# Patient Record
Sex: MALE | URBAN - METROPOLITAN AREA
[De-identification: names, ages, dates, MRNs, and addresses within clinical notes are randomized per-mention and may not be internally consistent; named-entity substitution may affect disease eponyms.]

---

## 2018-05-16 ENCOUNTER — APPOINTMENT (RX ONLY)
Dept: URBAN - METROPOLITAN AREA CLINIC 4 | Facility: CLINIC | Age: 68
Setting detail: DERMATOLOGY
End: 2018-05-16

## 2018-05-16 DIAGNOSIS — A60.1 HERPESVIRAL INFECTION OF PERIANAL SKIN AND RECTUM: ICD-10-CM

## 2018-05-16 DIAGNOSIS — L21.8 OTHER SEBORRHEIC DERMATITIS: ICD-10-CM

## 2018-05-16 DIAGNOSIS — L85.3 XEROSIS CUTIS: ICD-10-CM

## 2018-05-16 PROCEDURE — 99202 OFFICE O/P NEW SF 15 MIN: CPT

## 2018-05-16 PROCEDURE — ? PRESCRIPTION

## 2018-05-16 PROCEDURE — ? COUNSELING

## 2018-05-16 RX ORDER — HYDROCORTISONE 25 MG/G
CREAM TOPICAL
Qty: 1 | Refills: 2 | Status: CANCELLED

## 2018-05-16 ASSESSMENT — LOCATION ZONE DERM
LOCATION ZONE: SCALP
LOCATION ZONE: TRUNK
LOCATION ZONE: FACE
LOCATION ZONE: TRUNK

## 2018-05-16 ASSESSMENT — LOCATION DETAILED DESCRIPTION DERM
LOCATION DETAILED: LEFT BUTTOCK
LOCATION DETAILED: RIGHT MEDIAL FRONTAL SCALP
LOCATION DETAILED: LEFT BUTTOCK
LOCATION DETAILED: INFERIOR THORACIC SPINE
LOCATION DETAILED: SUPERIOR MID FOREHEAD

## 2018-05-16 ASSESSMENT — LOCATION SIMPLE DESCRIPTION DERM
LOCATION SIMPLE: LEFT BUTTOCK
LOCATION SIMPLE: SUPERIOR FOREHEAD
LOCATION SIMPLE: UPPER BACK
LOCATION SIMPLE: RIGHT SCALP
LOCATION SIMPLE: LEFT BUTTOCK

## 2018-10-11 ENCOUNTER — HOSPITAL ENCOUNTER (OUTPATIENT)
Dept: HOSPITAL 8 - CFH | Age: 68
Discharge: HOME | End: 2018-10-11
Attending: PSYCHIATRY & NEUROLOGY
Payer: MEDICARE

## 2018-10-11 DIAGNOSIS — M51.35: Primary | ICD-10-CM

## 2018-10-11 DIAGNOSIS — M99.53: ICD-10-CM

## 2018-10-11 DIAGNOSIS — M50.30: ICD-10-CM

## 2018-10-11 PROCEDURE — 72156 MRI NECK SPINE W/O & W/DYE: CPT

## 2018-10-11 PROCEDURE — A9585 GADOBUTROL INJECTION: HCPCS

## 2018-10-11 PROCEDURE — 72157 MRI CHEST SPINE W/O & W/DYE: CPT

## 2018-10-11 PROCEDURE — 72158 MRI LUMBAR SPINE W/O & W/DYE: CPT

## 2019-12-23 ENCOUNTER — APPOINTMENT (OUTPATIENT)
Dept: RADIOLOGY | Facility: MEDICAL CENTER | Age: 69
End: 2019-12-23
Attending: EMERGENCY MEDICINE
Payer: MEDICARE

## 2019-12-23 ENCOUNTER — APPOINTMENT (OUTPATIENT)
Dept: CARDIOLOGY | Facility: MEDICAL CENTER | Age: 69
End: 2019-12-23
Attending: HOSPITALIST
Payer: MEDICARE

## 2019-12-23 ENCOUNTER — HOSPITAL ENCOUNTER (OUTPATIENT)
Facility: MEDICAL CENTER | Age: 69
End: 2019-12-24
Attending: EMERGENCY MEDICINE | Admitting: HOSPITALIST
Payer: MEDICARE

## 2019-12-23 PROBLEM — B00.9 HERPES: Status: ACTIVE | Noted: 2019-12-23

## 2019-12-23 PROBLEM — R53.1 LEFT-SIDED WEAKNESS: Status: ACTIVE | Noted: 2019-12-23

## 2019-12-23 LAB
ANION GAP SERPL CALC-SCNC: 12 MMOL/L (ref 0–11.9)
BASOPHILS # BLD AUTO: 0.6 % (ref 0–1.8)
BASOPHILS # BLD: 0.03 K/UL (ref 0–0.12)
BUN SERPL-MCNC: 14 MG/DL (ref 8–22)
CALCIUM SERPL-MCNC: 9.6 MG/DL (ref 8.4–10.2)
CHLORIDE SERPL-SCNC: 103 MMOL/L (ref 96–112)
CO2 SERPL-SCNC: 26 MMOL/L (ref 20–33)
CREAT SERPL-MCNC: 0.9 MG/DL (ref 0.5–1.4)
EKG IMPRESSION: NORMAL
EOSINOPHIL # BLD AUTO: 0.06 K/UL (ref 0–0.51)
EOSINOPHIL NFR BLD: 1.1 % (ref 0–6.9)
ERYTHROCYTE [DISTWIDTH] IN BLOOD BY AUTOMATED COUNT: 41 FL (ref 35.9–50)
GLUCOSE SERPL-MCNC: 113 MG/DL (ref 65–99)
HCT VFR BLD AUTO: 44.1 % (ref 42–52)
HGB BLD-MCNC: 15 G/DL (ref 14–18)
IMM GRANULOCYTES # BLD AUTO: 0.01 K/UL (ref 0–0.11)
IMM GRANULOCYTES NFR BLD AUTO: 0.2 % (ref 0–0.9)
LYMPHOCYTES # BLD AUTO: 1.22 K/UL (ref 1–4.8)
LYMPHOCYTES NFR BLD: 23 % (ref 22–41)
MCH RBC QN AUTO: 32.1 PG (ref 27–33)
MCHC RBC AUTO-ENTMCNC: 34 G/DL (ref 33.7–35.3)
MCV RBC AUTO: 94.2 FL (ref 81.4–97.8)
MONOCYTES # BLD AUTO: 0.56 K/UL (ref 0–0.85)
MONOCYTES NFR BLD AUTO: 10.5 % (ref 0–13.4)
NEUTROPHILS # BLD AUTO: 3.43 K/UL (ref 1.82–7.42)
NEUTROPHILS NFR BLD: 64.6 % (ref 44–72)
NRBC # BLD AUTO: 0 K/UL
NRBC BLD-RTO: 0 /100 WBC
PLATELET # BLD AUTO: 213 K/UL (ref 164–446)
PMV BLD AUTO: 10.1 FL (ref 9–12.9)
POTASSIUM SERPL-SCNC: 3.6 MMOL/L (ref 3.6–5.5)
RBC # BLD AUTO: 4.68 M/UL (ref 4.7–6.1)
SODIUM SERPL-SCNC: 141 MMOL/L (ref 135–145)
WBC # BLD AUTO: 5.3 K/UL (ref 4.8–10.8)

## 2019-12-23 PROCEDURE — 85025 COMPLETE CBC W/AUTO DIFF WBC: CPT

## 2019-12-23 PROCEDURE — 70450 CT HEAD/BRAIN W/O DYE: CPT

## 2019-12-23 PROCEDURE — 700111 HCHG RX REV CODE 636 W/ 250 OVERRIDE (IP): Performed by: HOSPITALIST

## 2019-12-23 PROCEDURE — 80048 BASIC METABOLIC PNL TOTAL CA: CPT

## 2019-12-23 PROCEDURE — 96372 THER/PROPH/DIAG INJ SC/IM: CPT

## 2019-12-23 PROCEDURE — 83036 HEMOGLOBIN GLYCOSYLATED A1C: CPT

## 2019-12-23 PROCEDURE — G0378 HOSPITAL OBSERVATION PER HR: HCPCS

## 2019-12-23 PROCEDURE — 70551 MRI BRAIN STEM W/O DYE: CPT

## 2019-12-23 PROCEDURE — 93306 TTE W/DOPPLER COMPLETE: CPT

## 2019-12-23 PROCEDURE — 99285 EMERGENCY DEPT VISIT HI MDM: CPT

## 2019-12-23 PROCEDURE — A9270 NON-COVERED ITEM OR SERVICE: HCPCS | Performed by: HOSPITALIST

## 2019-12-23 PROCEDURE — 70544 MR ANGIOGRAPHY HEAD W/O DYE: CPT

## 2019-12-23 PROCEDURE — 93005 ELECTROCARDIOGRAM TRACING: CPT | Performed by: EMERGENCY MEDICINE

## 2019-12-23 PROCEDURE — 99220 PR INITIAL OBSERVATION CARE,LEVL III: CPT | Performed by: HOSPITALIST

## 2019-12-23 PROCEDURE — 700102 HCHG RX REV CODE 250 W/ 637 OVERRIDE(OP): Performed by: HOSPITALIST

## 2019-12-23 PROCEDURE — 36415 COLL VENOUS BLD VENIPUNCTURE: CPT

## 2019-12-23 RX ORDER — ACYCLOVIR 400 MG/1
400 TABLET ORAL DAILY
COMMUNITY
End: 2023-01-31

## 2019-12-23 RX ORDER — IBUPROFEN 200 MG
800 TABLET ORAL 2 TIMES DAILY PRN
COMMUNITY

## 2019-12-23 RX ORDER — ASPIRIN 325 MG
325 TABLET ORAL DAILY
Status: DISCONTINUED | OUTPATIENT
Start: 2019-12-23 | End: 2019-12-24 | Stop reason: HOSPADM

## 2019-12-23 RX ORDER — ONDANSETRON 2 MG/ML
4 INJECTION INTRAMUSCULAR; INTRAVENOUS EVERY 4 HOURS PRN
Status: DISCONTINUED | OUTPATIENT
Start: 2019-12-23 | End: 2019-12-24 | Stop reason: HOSPADM

## 2019-12-23 RX ORDER — AMOXICILLIN 250 MG
2 CAPSULE ORAL 2 TIMES DAILY
Status: DISCONTINUED | OUTPATIENT
Start: 2019-12-23 | End: 2019-12-24 | Stop reason: HOSPADM

## 2019-12-23 RX ORDER — ACYCLOVIR 200 MG/1
400 CAPSULE ORAL DAILY
Status: DISCONTINUED | OUTPATIENT
Start: 2019-12-24 | End: 2019-12-24 | Stop reason: HOSPADM

## 2019-12-23 RX ORDER — BISACODYL 10 MG
10 SUPPOSITORY, RECTAL RECTAL
Status: DISCONTINUED | OUTPATIENT
Start: 2019-12-23 | End: 2019-12-24 | Stop reason: HOSPADM

## 2019-12-23 RX ORDER — ATORVASTATIN CALCIUM 40 MG/1
80 TABLET, FILM COATED ORAL EVERY EVENING
Status: DISCONTINUED | OUTPATIENT
Start: 2019-12-23 | End: 2019-12-24 | Stop reason: HOSPADM

## 2019-12-23 RX ORDER — ONDANSETRON 4 MG/1
4 TABLET, ORALLY DISINTEGRATING ORAL EVERY 4 HOURS PRN
Status: DISCONTINUED | OUTPATIENT
Start: 2019-12-23 | End: 2019-12-24 | Stop reason: HOSPADM

## 2019-12-23 RX ORDER — POLYETHYLENE GLYCOL 3350 17 G/17G
1 POWDER, FOR SOLUTION ORAL
Status: DISCONTINUED | OUTPATIENT
Start: 2019-12-23 | End: 2019-12-24 | Stop reason: HOSPADM

## 2019-12-23 RX ORDER — ASPIRIN 600 MG/1
300 SUPPOSITORY RECTAL DAILY
Status: DISCONTINUED | OUTPATIENT
Start: 2019-12-23 | End: 2019-12-24 | Stop reason: HOSPADM

## 2019-12-23 RX ORDER — ASPIRIN 81 MG/1
324 TABLET, CHEWABLE ORAL DAILY
Status: DISCONTINUED | OUTPATIENT
Start: 2019-12-23 | End: 2019-12-24 | Stop reason: HOSPADM

## 2019-12-23 RX ADMIN — ATORVASTATIN CALCIUM 80 MG: 40 TABLET, FILM COATED ORAL at 17:42

## 2019-12-23 RX ADMIN — ASPIRIN 325 MG ORAL TABLET 325 MG: 325 PILL ORAL at 15:36

## 2019-12-23 RX ADMIN — ENOXAPARIN SODIUM 40 MG: 100 INJECTION SUBCUTANEOUS at 15:36

## 2019-12-23 ASSESSMENT — LIFESTYLE VARIABLES
TOTAL SCORE: 1
EVER HAD A DRINK FIRST THING IN THE MORNING TO STEADY YOUR NERVES TO GET RID OF A HANGOVER: NO
EVER FELT BAD OR GUILTY ABOUT YOUR DRINKING: NO
HAVE YOU EVER FELT YOU SHOULD CUT DOWN ON YOUR DRINKING: YES
HAVE PEOPLE ANNOYED YOU BY CRITICIZING YOUR DRINKING: NO
AVERAGE NUMBER OF DAYS PER WEEK YOU HAVE A DRINK CONTAINING ALCOHOL: 3
CONSUMPTION TOTAL: NEGATIVE
ALCOHOL_USE: YES
ON A TYPICAL DAY WHEN YOU DRINK ALCOHOL HOW MANY DRINKS DO YOU HAVE: 2
EVER_SMOKED: NEVER
TOTAL SCORE: 1
HOW MANY TIMES IN THE PAST YEAR HAVE YOU HAD 5 OR MORE DRINKS IN A DAY: 0
TOTAL SCORE: 1

## 2019-12-23 ASSESSMENT — COGNITIVE AND FUNCTIONAL STATUS - GENERAL
MOVING TO AND FROM BED TO CHAIR: A LITTLE
MOBILITY SCORE: 19
WALKING IN HOSPITAL ROOM: A LITTLE
SUGGESTED CMS G CODE MODIFIER MOBILITY: CK
DAILY ACTIVITIY SCORE: 24
MOVING FROM LYING ON BACK TO SITTING ON SIDE OF FLAT BED: A LITTLE
SUGGESTED CMS G CODE MODIFIER DAILY ACTIVITY: CH
STANDING UP FROM CHAIR USING ARMS: A LITTLE
CLIMB 3 TO 5 STEPS WITH RAILING: A LITTLE

## 2019-12-23 ASSESSMENT — ENCOUNTER SYMPTOMS
NAUSEA: 0
TINGLING: 0
PHOTOPHOBIA: 0
EYE PAIN: 0
MYALGIAS: 1
SHORTNESS OF BREATH: 0
DEPRESSION: 0
HEADACHES: 0
NECK PAIN: 0
SPUTUM PRODUCTION: 0
DOUBLE VISION: 0
STRIDOR: 0
HEMOPTYSIS: 0
NERVOUS/ANXIOUS: 0
FEVER: 0
CONSTIPATION: 0
BLOOD IN STOOL: 0
MEMORY LOSS: 0
DIZZINESS: 0
ORTHOPNEA: 0
BACK PAIN: 0
PND: 0
COUGH: 0
WEAKNESS: 1
SENSORY CHANGE: 0
TREMORS: 0
PALPITATIONS: 0
VOMITING: 0
CLAUDICATION: 0
CHILLS: 0
HEARTBURN: 0
SORE THROAT: 0
FOCAL WEAKNESS: 1
BLURRED VISION: 0
SPEECH CHANGE: 0

## 2019-12-23 ASSESSMENT — COPD QUESTIONNAIRES
DURING THE PAST 4 WEEKS HOW MUCH DID YOU FEEL SHORT OF BREATH: NONE/LITTLE OF THE TIME
COPD SCREENING SCORE: 4
DO YOU EVER COUGH UP ANY MUCUS OR PHLEGM?: YES, EVERY DAY
HAVE YOU SMOKED AT LEAST 100 CIGARETTES IN YOUR ENTIRE LIFE: NO/DON'T KNOW
IN THE PAST 12 MONTHS DO YOU DO LESS THAN YOU USED TO BECAUSE OF YOUR BREATHING PROBLEMS: DISAGREE/UNSURE

## 2019-12-23 ASSESSMENT — PATIENT HEALTH QUESTIONNAIRE - PHQ9
SUM OF ALL RESPONSES TO PHQ9 QUESTIONS 1 AND 2: 0
2. FEELING DOWN, DEPRESSED, IRRITABLE, OR HOPELESS: NOT AT ALL
1. LITTLE INTEREST OR PLEASURE IN DOING THINGS: NOT AT ALL

## 2019-12-23 NOTE — CARE PLAN
Problem: Safety  Goal: Will remain free from falls  Outcome: PROGRESSING AS EXPECTED   Falls precautions are in place, patient's bed is locked and low, belongings are within reach. Patient uses call light appropriately.     Problem: Knowledge Deficit  Goal: Knowledge of disease process/condition, treatment plan, diagnostic tests, and medications will improve  Outcome: PROGRESSING AS EXPECTED  Discussed plan of care with patient including echo ordered, MRI, dysphagia screening, neuro checks and NIHSS. Allowed time for questions, patient agreed and verbalized understanding.

## 2019-12-23 NOTE — ED TRIAGE NOTES
"Pt presents with left leg weakness that is intermittent since Thursday. Patient states he had an episode of \"seizure like\" activity that woke him from sleep Thursday,  No hx of seizure, no loss of bowels/bladder during episode. Patient ambulatory to room, no drift, drop, droop. Denies headache. Denies trauma.   "

## 2019-12-23 NOTE — ED NOTES
ERP at bedside. Pt agrees with plan of care discussed by ERP. AIDET acknowledged with patient. Misha in low position, side rail up for pt safety. Call light within reach. Will continue to monitor.      PER ERP THIS IS NOT A CODE STROKE    IV established. Blood sent to lab.

## 2019-12-23 NOTE — ASSESSMENT & PLAN NOTE
Unclear if this is a stroke vs lumbar radiculopathy?  MRI/MRA brain ordered and pending.  MRI lumbar/thoracic ordered.  Echocardiogram ordered  PT/OT  Aspirin, high intensity statin ordered for neuro protective measures

## 2019-12-23 NOTE — ED PROVIDER NOTES
"ED Provider Note    Scribed for Asim Israel M.D. by Asim Israel. 12/23/2019,  12:32 PM.    CHIEF COMPLAINT  Chief Complaint   Patient presents with   • Weakness     patient states left leg weakness intermittently since thursday   • Seizure     patient states he had an episode of \"jolting\" that woke him up thursday,  no hx seizures       HPI  Girish Tyler is a 69 y.o. male who presents to the Emergency Department for strokelike symptoms, since Thursday night of last week.  Therefore, he is not a never was a TPA or clot retrieval candidate.  He reports that about 1:30 in the morning on Thursday, he was woken from sleep by \"tremors\" in his head, lasting 10 or 15 seconds that he says caused physical spasms.  Almost simultaneously, he noticed a charley horse sensation in his left proximal thigh.  This is been followed by persistent weakness in that leg, with a sensation of numbness, which she describes as \"thickness.\"  He says that he had a Tarlov cyst surgery years ago, and has had some mild weakness in the left leg since then, but this is much worse than usual in terms of weakness, and he has never had a problem with sensation changes.  He has had some dull occasional right-sided headache since the incident.  He is ambulatory.  He denies fevers or chills, nausea or vomiting, chest pain or shortness of breath or confusion.  Symptoms have been fairly consistent since Thursday.  He said that on Saturday, he had 1 ground-level fall, falling only to his knees.  He is not on any blood thinners.    REVIEW OF SYSTEMS  See HPI for further details. All other systems are negative.     PAST MEDICAL HISTORY   Tarlov cyst excision, baseline L leg weakness.     SOCIAL HISTORY  Social History     Tobacco Use   • Smoking status: Never Smoker   • Smokeless tobacco: Never Used   Substance and Sexual Activity   • Alcohol use: Not on file   • Drug use: Not on file   • Sexual activity: Not on file     Social History "     Substance and Sexual Activity   Drug Use Not on file       SURGICAL HISTORY  patient denies any surgical history    CURRENT MEDICATIONS  Home Medications     Reviewed by Yasmeen Jackson R.N. (Registered Nurse) on 12/23/19 at 1446  Med List Status: Complete   Medication Last Dose Status   acyclovir (ZOVIRAX) 400 MG tablet 12/23/2019 Active   ibuprofen (MOTRIN) 200 MG Tab > 3 DAYS Active                ALLERGIES  No Known Allergies    PHYSICAL EXAM  VITAL SIGNS: /68   Pulse (!) 59   Temp 36.4 °C (97.5 °F) (Oral)   Resp 16   Ht 1.829 m (6')   Wt 70.6 kg (155 lb 10.3 oz)   SpO2 94%   BMI 21.11 kg/m²   Pulse ox interpretation: I interpret this pulse ox as normal.  Constitutional: Alert in no apparent distress.  HENT: No signs of trauma, Bilateral external ears normal, Nose normal.   Eyes: Conjunctiva normal, Non-icteric.   Neck: Normal range of motion, Supple, No stridor.   Lymphatic: No lymphadenopathy noted.   Cardiovascular: Regular rate and rhythm, no murmurs.   Thorax & Lungs: Normal breath sounds, No respiratory distress, No wheezing, No chest tenderness.   Abdomen: Bowel sounds normal, Soft, No tenderness, No masses, No pulsatile masses. No peritoneal signs.  Skin: Warm, Dry, No erythema, No rash.   Extremities: Intact distal pulses, No edema, No cyanosis.  Musculoskeletal: Good range of motion in all major joints. No or major deformities noted.   Neurologic: Alert, cranial nerves II through X intact, fully alert and oriented, normal strength and range of motion and coordination in all extremities, with the exception of the left leg.  Left leg is weak in flexion and extension at the ankle, and even against gravity at the hip.  Psychiatric: Affect normal, Judgment normal, Mood normal.     DIAGNOSTIC STUDIES / PROCEDURES    LABS  Labs Reviewed   CBC WITH DIFFERENTIAL - Abnormal; Notable for the following components:       Result Value    RBC 4.68 (*)     All other components within normal limits    BASIC METABOLIC PANEL - Abnormal; Notable for the following components:    Glucose 113 (*)     Anion Gap 12.0 (*)     All other components within normal limits   CBC WITH DIFFERENTIAL - Abnormal; Notable for the following components:    WBC 4.5 (*)     RBC 4.54 (*)     All other components within normal limits    Narrative:     Fasting   ESTIMATED GFR   HEMOGLOBIN A1C   LIPID PROFILE    Narrative:     Fasting   COMP METABOLIC PANEL    Narrative:     Fasting   ESTIMATED GFR    Narrative:     Fasting     All labs reviewed by me.    RADIOLOGY  EC-ECHOCARDIOGRAM COMPLETE W/O CONT   Final Result      CT-HEAD W/O   Final Result      No acute intracranial abnormality.      MR-MRA HEAD-W/O   Final Result               MR-BRAIN-W/O   Final Result      MRI of the brain without contrast within normal limits for age with mild white matter changes.      CT-CTA HEAD WITH & W/O-POST PROCESS    (Results Pending)   CT-CTA NECK WITH & W/O-POST PROCESSING    (Results Pending)   MR-THORACIC SPINE-W/O    (Results Pending)   MR-LUMBAR SPINE-W/O    (Results Pending)     The radiologist's interpretation of all radiological studies have been reviewed by me.    COURSE & MEDICAL DECISION MAKING  Nursing notes, VS, PMSFHx reviewed in chart.     12:32 PM Patient seen and examined at bedside. Differential diagnosis includes but is not limited to hemorrhagic or ischemic stroke, multiple sclerosis, migraine equivalent, less likely Watson's paralysis. Ordered for stroke protocol labs and imaging to evaluate.     1:51 PM Dr. MENDIOLA agrees to admit. This patient's head CT is unremarkable, but our CTA-capable CT machine isn't working. He'll need MRI imaging regarding this demonstrable deficit, as well as additional stroke evaluation such as ultrasound testing.     DISPOSITION:  Patient will be admitted to the hospitalist service in stable condition.      FINAL IMPRESSION  1. Stroke-like symptoms  2. Left leg weakness

## 2019-12-23 NOTE — H&P
"  Hospital Medicine History & Physical Note    Date of Service  12/23/2019    Primary Care Physician  Damaris Barrett M.D.    Consultants  None    Code Status  Full Code    Chief Complaint  Chief Complaint   Patient presents with   • Weakness     patient states left leg weakness intermittently since thursday   • Seizure     patient states he had an episode of \"jolting\" that woke him up thursday,  no hx seizures       History of Presenting Illness  Nayeli is a very pleasant 69 y.o. male with a past medical history of discogenic disorder, with occasional left-sided leg discomfort presented to the emergency room on 12/23/2019 for evaluation of left lower extremity weakness which started Thursday night, around 1:30 in the morning patient woke up with tremors in addition to having awkward feeling in his head that lasted about 10 seconds, he felt like he was spasming, then he noticed a charley horse in his left proximal thigh area then followed with weakness, decreased sensation.  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  Otherwise denies having any vision changes headaches, chest pain shortness of breath or nausea vomiting.  He did sustain a 1 ground-level fall today which he fell to his knees, denied any head trauma or loss of consciousness.    Review of Systems  Review of Systems   Constitutional: Negative for chills, fever and malaise/fatigue.   HENT: Negative for congestion, hearing loss, sore throat and tinnitus.    Eyes: Negative for blurred vision, double vision, photophobia and pain.   Respiratory: Negative for cough, hemoptysis, sputum production, shortness of breath and stridor.    Cardiovascular: Negative for chest pain, palpitations, orthopnea, claudication and PND.   Gastrointestinal: Negative for blood in stool, constipation, heartburn, melena, nausea and vomiting. "   Genitourinary: Negative for dysuria, frequency and urgency.   Musculoskeletal: Positive for myalgias. Negative for back pain and neck pain.   Neurological: Positive for focal weakness and weakness. Negative for dizziness, tingling, tremors, sensory change, speech change and headaches.   Psychiatric/Behavioral: Negative for depression, memory loss and suicidal ideas. The patient is not nervous/anxious.    All other systems reviewed and are negative.      Past Medical History  Herpes simplex    Surgical History  Discectomy    Family History  Mother and sister have had strokes, mother  of a stroke.     Social History  Patient denies using tobacco alcohol or illicit drugs    Allergies  No Known Allergies    Medications  Prior to Admission medications    Medication Sig Start Date End Date Taking? Authorizing Provider   acyclovir (ZOVIRAX) 400 MG tablet Take 400 mg by mouth every day.   Yes Physician Outpatient   ibuprofen (MOTRIN) 200 MG Tab Take 800 mg by mouth 2 times a day as needed (PAIN).   Yes Physician Outpatient       Physical Exam  Temp:  [36.3 °C (97.4 °F)] 36.3 °C (97.4 °F)  Pulse:  [79-80] 79  Resp:  [18] 18  BP: (149)/(86) 149/86  SpO2:  [98 %] 98 %  Physical Exam   Constitutional: He is oriented to person, place, and time. He appears well-developed and well-nourished. No distress.   HENT:   Head: Normocephalic and atraumatic.   Mouth/Throat: No oropharyngeal exudate.   Eyes: Pupils are equal, round, and reactive to light. Conjunctivae are normal. Right eye exhibits no discharge. No scleral icterus.   Neck: Neck supple. No JVD present. No thyromegaly present.   Cardiovascular: Normal rate and intact distal pulses.   No murmur heard.  Pulses:       Dorsalis pedis pulses are 2+ on the right side and 2+ on the left side.   Cap refill < 3 s   Pulmonary/Chest: Effort normal and breath sounds normal. No stridor. No respiratory distress. He has no wheezes. He has no rales.   Abdominal: Soft. Bowel sounds are  normal. He exhibits no distension. There is no tenderness. There is no rebound.   Musculoskeletal: Normal range of motion.         General: No edema.   Neurological: He is alert and oriented to person, place, and time. No cranial nerve deficit.   Patient is able to wrinkle forehead equal and bilaterally, Strength 2-3/5 LUE spasms, 5/5 RUE, 5/5 LLE, 5/5 RLE,   Sensation to light touch intact  Plantar Flexion, Dorsiflexion, Extensor Hallicus Longus 5/5,  No clonus noted ankle/elbow  Finger to nose equal bilaterally  No clonus or gaze     Skin: Skin is warm and dry. He is not diaphoretic. No erythema.   Psychiatric: He has a normal mood and affect. His behavior is normal. Thought content normal.   Nursing note and vitals reviewed.      Laboratory:  Recent Labs     12/23/19  1240   WBC 5.3   RBC 4.68*   HEMOGLOBIN 15.0   HEMATOCRIT 44.1   MCV 94.2   MCH 32.1   MCHC 34.0   RDW 41.0   PLATELETCT 213   MPV 10.1     Recent Labs     12/23/19  1240   SODIUM 141   POTASSIUM 3.6   CHLORIDE 103   CO2 26   GLUCOSE 113*   BUN 14   CREATININE 0.90   CALCIUM 9.6     Recent Labs     12/23/19  1240   GLUCOSE 113*               Urinalysis:          Imaging:  CT-HEAD W/O    (Results Pending)   CT-CTA HEAD WITH & W/O-POST PROCESS    (Results Pending)   CT-CTA NECK WITH & W/O-POST PROCESSING    (Results Pending)   MR-BRAIN-W/O    (Results Pending)   MR-MRA HEAD-W/O    (Results Pending)   EC-ECHOCARDIOGRAM COMPLETE W/O CONT    (Results Pending)       Assessment/Plan:  I anticipate this patient is appropriate for observation status at this time.    * Left-sided weakness  Assessment & Plan  Unclear if this is a stroke vs lumbar radiculopathy?  MRI/MRA brain ordered and pending.  MRI lumbar/thoracic ordered.  Echocardiogram ordered  PT/OT  Aspirin, high intensity statin ordered for neuro protective measures    Herpes  Assessment & Plan  Patient has oral and genital herpes which is controlled with acyclovir which we will continue      VTE  prophylaxis: Prophylaxis: lovenox

## 2019-12-23 NOTE — PROGRESS NOTES
Pt arrived to unit via gurney. Ambulated from gurney to bed, standby assist. Tele monitor applied, vitals taken. Pt assessed. A&O x 4. Admit profile and med rec complete. Discussed POC with pt, including Echo, MRI, neuro checks, and NIHSS. Welcome folder provided and discussed. Communication board filled out. Questions and concerns addressed, verbalized understanding. Fall precautions in place. Pt demonstrates ability to use call light appropriately. Pt left in lowest position. Bed locked and low.

## 2019-12-24 ENCOUNTER — APPOINTMENT (OUTPATIENT)
Dept: RADIOLOGY | Facility: MEDICAL CENTER | Age: 69
End: 2019-12-24
Attending: HOSPITALIST
Payer: MEDICARE

## 2019-12-24 VITALS
HEIGHT: 72 IN | WEIGHT: 155.65 LBS | SYSTOLIC BLOOD PRESSURE: 122 MMHG | RESPIRATION RATE: 16 BRPM | DIASTOLIC BLOOD PRESSURE: 73 MMHG | HEART RATE: 66 BPM | TEMPERATURE: 97.5 F | OXYGEN SATURATION: 96 % | BODY MASS INDEX: 21.08 KG/M2

## 2019-12-24 LAB
ALBUMIN SERPL BCP-MCNC: 4 G/DL (ref 3.2–4.9)
ALBUMIN/GLOB SERPL: 1.6 G/DL
ALP SERPL-CCNC: 42 U/L (ref 30–99)
ALT SERPL-CCNC: 12 U/L (ref 2–50)
ANION GAP SERPL CALC-SCNC: 11 MMOL/L (ref 0–11.9)
AST SERPL-CCNC: 14 U/L (ref 12–45)
BASOPHILS # BLD AUTO: 0.4 % (ref 0–1.8)
BASOPHILS # BLD: 0.02 K/UL (ref 0–0.12)
BILIRUB SERPL-MCNC: 0.9 MG/DL (ref 0.1–1.5)
BUN SERPL-MCNC: 18 MG/DL (ref 8–22)
CALCIUM SERPL-MCNC: 8.9 MG/DL (ref 8.4–10.2)
CHLORIDE SERPL-SCNC: 106 MMOL/L (ref 96–112)
CHOLEST SERPL-MCNC: 178 MG/DL (ref 100–199)
CO2 SERPL-SCNC: 24 MMOL/L (ref 20–33)
CREAT SERPL-MCNC: 0.96 MG/DL (ref 0.5–1.4)
EOSINOPHIL # BLD AUTO: 0.08 K/UL (ref 0–0.51)
EOSINOPHIL NFR BLD: 1.8 % (ref 0–6.9)
ERYTHROCYTE [DISTWIDTH] IN BLOOD BY AUTOMATED COUNT: 40.5 FL (ref 35.9–50)
EST. AVERAGE GLUCOSE BLD GHB EST-MCNC: 103 MG/DL
GLOBULIN SER CALC-MCNC: 2.5 G/DL (ref 1.9–3.5)
GLUCOSE SERPL-MCNC: 96 MG/DL (ref 65–99)
HBA1C MFR BLD: 5.2 % (ref 0–5.6)
HCT VFR BLD AUTO: 42.5 % (ref 42–52)
HDLC SERPL-MCNC: 63 MG/DL
HGB BLD-MCNC: 14.5 G/DL (ref 14–18)
IMM GRANULOCYTES # BLD AUTO: 0.01 K/UL (ref 0–0.11)
IMM GRANULOCYTES NFR BLD AUTO: 0.2 % (ref 0–0.9)
LDLC SERPL CALC-MCNC: 97 MG/DL
LV EJECT FRACT  99904: 65
LV EJECT FRACT MOD 2C 99903: 55.53
LV EJECT FRACT MOD 4C 99902: 72.83
LV EJECT FRACT MOD BP 99901: 64.33
LYMPHOCYTES # BLD AUTO: 1.43 K/UL (ref 1–4.8)
LYMPHOCYTES NFR BLD: 31.6 % (ref 22–41)
MCH RBC QN AUTO: 31.9 PG (ref 27–33)
MCHC RBC AUTO-ENTMCNC: 34.1 G/DL (ref 33.7–35.3)
MCV RBC AUTO: 93.6 FL (ref 81.4–97.8)
MONOCYTES # BLD AUTO: 0.48 K/UL (ref 0–0.85)
MONOCYTES NFR BLD AUTO: 10.6 % (ref 0–13.4)
NEUTROPHILS # BLD AUTO: 2.5 K/UL (ref 1.82–7.42)
NEUTROPHILS NFR BLD: 55.4 % (ref 44–72)
NRBC # BLD AUTO: 0 K/UL
NRBC BLD-RTO: 0 /100 WBC
PLATELET # BLD AUTO: 202 K/UL (ref 164–446)
PMV BLD AUTO: 10.1 FL (ref 9–12.9)
POTASSIUM SERPL-SCNC: 4.1 MMOL/L (ref 3.6–5.5)
PROT SERPL-MCNC: 6.5 G/DL (ref 6–8.2)
RBC # BLD AUTO: 4.54 M/UL (ref 4.7–6.1)
SODIUM SERPL-SCNC: 141 MMOL/L (ref 135–145)
TRIGL SERPL-MCNC: 91 MG/DL (ref 0–149)
WBC # BLD AUTO: 4.5 K/UL (ref 4.8–10.8)

## 2019-12-24 PROCEDURE — 80053 COMPREHEN METABOLIC PANEL: CPT

## 2019-12-24 PROCEDURE — 93306 TTE W/DOPPLER COMPLETE: CPT | Mod: 26 | Performed by: INTERNAL MEDICINE

## 2019-12-24 PROCEDURE — 99217 PR OBSERVATION CARE DISCHARGE: CPT | Performed by: HOSPITALIST

## 2019-12-24 PROCEDURE — 85025 COMPLETE CBC W/AUTO DIFF WBC: CPT

## 2019-12-24 PROCEDURE — 72148 MRI LUMBAR SPINE W/O DYE: CPT

## 2019-12-24 PROCEDURE — G0378 HOSPITAL OBSERVATION PER HR: HCPCS

## 2019-12-24 PROCEDURE — 97161 PT EVAL LOW COMPLEX 20 MIN: CPT

## 2019-12-24 PROCEDURE — 700102 HCHG RX REV CODE 250 W/ 637 OVERRIDE(OP): Performed by: HOSPITALIST

## 2019-12-24 PROCEDURE — 97165 OT EVAL LOW COMPLEX 30 MIN: CPT

## 2019-12-24 PROCEDURE — 700111 HCHG RX REV CODE 636 W/ 250 OVERRIDE (IP): Performed by: HOSPITALIST

## 2019-12-24 PROCEDURE — 80061 LIPID PANEL: CPT

## 2019-12-24 PROCEDURE — 72146 MRI CHEST SPINE W/O DYE: CPT

## 2019-12-24 PROCEDURE — A9270 NON-COVERED ITEM OR SERVICE: HCPCS | Performed by: HOSPITALIST

## 2019-12-24 PROCEDURE — 96372 THER/PROPH/DIAG INJ SC/IM: CPT

## 2019-12-24 RX ADMIN — ACYCLOVIR 400 MG: 200 CAPSULE ORAL at 06:10

## 2019-12-24 RX ADMIN — ASPIRIN 325 MG ORAL TABLET 325 MG: 325 PILL ORAL at 06:10

## 2019-12-24 RX ADMIN — ENOXAPARIN SODIUM 40 MG: 100 INJECTION SUBCUTANEOUS at 06:11

## 2019-12-24 ASSESSMENT — COGNITIVE AND FUNCTIONAL STATUS - GENERAL
DAILY ACTIVITIY SCORE: 24
SUGGESTED CMS G CODE MODIFIER DAILY ACTIVITY: CH

## 2019-12-24 ASSESSMENT — GAIT ASSESSMENTS
GAIT LEVEL OF ASSIST: SUPERVISED
DISTANCE (FEET): 150

## 2019-12-24 ASSESSMENT — ACTIVITIES OF DAILY LIVING (ADL): TOILETING: INDEPENDENT

## 2019-12-24 NOTE — CARE PLAN
Problem: Safety  Goal: Will remain free from injury  Outcome: PROGRESSING AS EXPECTED  Note:   Remind patient to use call light and provide assistance. Bed in low position, bed locked, and appropriate alarms set. Patient wearing non-slip socks. Call light and personal belongings are within reach.     Problem: Knowledge Deficit  Goal: Knowledge of the prescribed therapeutic regimen will improve  Outcome: PROGRESSING AS EXPECTED  Note:   Encourage patient and family to ask questions and be involved in plan of care. Provide education on treatment plan, diagnostic testing, and medications; have patient and family verbalize understanding.

## 2019-12-24 NOTE — PROGRESS NOTES
Received bedside report from KV[facility] Arana. Plan of care discussed. Safety precautions in place. Call light and personal belongings within reach. Patient has no needs at this time.

## 2019-12-24 NOTE — PROGRESS NOTES
Report received from day shift RN pt resting in bed no signs of distress. Pt states he is having improvement with numbness in left leg and better coordination on his left leg. Still feeling weaker then normal.  Will continue to monitor.

## 2019-12-24 NOTE — PROGRESS NOTES
Telemetry Shift Summary    Rhythm SR  HR Range 70  Ectopy none  Measurements .18/.08/.36        Normal Values  Rhythm SR  HR Range    Measurements 0.12-0.20 / 0.06-0.10  / 0.30-0.52

## 2019-12-24 NOTE — PROGRESS NOTES
Report given to Jah CALDERON. Plan of care discussed. Patient resting comfortably in bed. Safety precautions in place.

## 2019-12-24 NOTE — PROGRESS NOTES
Telemetry Shift Summary    Rhythm SR  HR Range 60s-70s  Ectopy o-rPVC, rTri  Measurements 0.20/0.06/0.42        Normal Values  Rhythm SR  HR Range    Measurements 0.12-0.20 / 0.06-0.10  / 0.30-0.52

## 2019-12-24 NOTE — THERAPY
"Occupational Therapy Evaluation completed.   Functional Status:  68 yo male admit for LLE weakness, fall at home.  Pt has h/o LLE weakness from a spinal cyst in past, states this time leg feels heavy, numb, both legs buckled.  Current brain imaging appears unremarkable, and pt reports symptoms are slowly improving.  Pt able to get up supervised, walk in [place] supervised without device, and able to manage socks, toileting, simple grooming supervised.  Pt normally manages intermittent weakness in LLE and has good strategies.  He works as a  when he is physically able.  Lives on property with Ex-wife, and will have assist avail from her if needed.  Pt appears close to baseline, and safe with simple ADl's.  No further inpt OT needs in this setting at this time.    Plan of Care: Patient with no further skilled OT needs in the acute care setting at this time  Discharge Recommendations:  Equipment: No Equipment Needed. Post-acute therapy Discharge to home with outpatient or home health for additional skilled therapy services if indicated.      See \"Rehab Therapy-Acute\" Patient Summary Report for complete documentation.    "

## 2019-12-24 NOTE — PROGRESS NOTES
2 RN skin check complete.   Devices in place tele box.  Skin assessed under devices yes.  Confirmed pressure ulcers found on n/a.  New potential pressure ulcers noted on n/a. Wound consult placed n/a.  The following interventions in place encouraged patient to turn in bed, provided pillows for support.

## 2019-12-24 NOTE — THERAPY
"Physical Therapy Evaluation completed.   Bed Mobility:  Supine to Sit: Supervised  Transfers: Sit to Stand: Supervised  Gait: Level Of Assist: Supervised      Plan of Care: Patient with no further skilled PT needs in the acute care setting at this time  Discharge Recommendations: Equipment: No Equipment Needed. Post-acute therapy Discharge to home with outpatient for additional skilled therapy services, pt goes to PT regularly.    Pt is a 70 yo male with diagnosis of r/o CVA. Functionally, pt is near baseline for mobility, has chronic L LE weakness from nerve injury so ambulates with a SPC occasionally. Pt reports that strength L LE is weaker than normal for him however it is better than yesterday overall. There is no skilled acute PT needs, pt is near baseline for mobility. Will DC PT.    See \"Rehab Therapy-Acute\" Patient Summary Report for complete documentation.     "

## 2019-12-25 NOTE — DISCHARGE INSTRUCTIONS
Discharge Instructions per Alexia Hopkins M.D.    Follow up with your neurosurgeon, I recommend taking a baby aspirin 81 mg daily for stroke prevention.    DIET: regular.    ACTIVITY: as tolerated.    DIAGNOSIS: TIA, no stroke    Return to ER if you have return of symptoms.    Discharge Instructions    Discharged to home by car with self. Discharged via wheelchair, hospital escort: Yes.  Special equipment needed: Not Applicable    Be sure to schedule a follow-up appointment with your primary care doctor or any specialists as instructed.     Discharge Plan:   Influenza Vaccine Indication: Not indicated: Previously immunized this influenza season and > 8 years of age    I understand that a diet low in cholesterol, fat, and sodium is recommended for good health. Unless I have been given specific instructions below for another diet, I accept this instruction as my diet prescription.   Other diet: regular    Special Instructions: None    · Is patient discharged on Warfarin / Coumadin?   No     Depression / Suicide Risk    As you are discharged from this [facility] facility, it is important to learn how to keep safe from harming yourself.    Recognize the warning signs:  · Abrupt changes in personality, positive or negative- including increase in energy   · Giving away possessions  · Change in eating patterns- significant weight changes-  positive or negative  · Change in sleeping patterns- unable to sleep or sleeping all the time   · Unwillingness or inability to communicate  · Depression  · Unusual sadness, discouragement and loneliness  · Talk of wanting to die  · Neglect of personal appearance   · Rebelliousness- reckless behavior  · Withdrawal from people/activities they love  · Confusion- inability to concentrate     If you or a loved one observes any of these behaviors or has concerns about self-harm, here's what you can do:  · Talk about it- your feelings and reasons for harming yourself  · Remove any means  that you might use to hurt yourself (examples: pills, rope, extension cords, firearm)  · Get professional help from the community (Mental Health, Substance Abuse, psychological counseling)  · Do not be alone:Call your Safe Contact- someone whom you trust who will be there for you.  · Call your local CRISIS HOTLINE 743-9957 or 415-792-6041  · Call your local Children's Mobile Crisis Response Team [place] (575) 660-7931 or www.[employer]  · Call the toll free National Suicide Prevention Hotlines   · National Suicide Prevention Lifeline 089-908-HMRD (4473)  · National Hope Line Network 800-SUICIDE (113-5360)

## 2019-12-25 NOTE — DISCHARGE SUMMARY
"Discharge Summary    CHIEF COMPLAINT ON ADMISSION  Chief Complaint   Patient presents with   • Weakness     patient states left leg weakness intermittently since thursday   • Seizure     patient states he had an episode of \"jolting\" that woke him up thursday,  no hx seizures       Reason for Admission  Possible Stroke     Admission Date  12/23/2019    CODE STATUS  Full Code    HPI & HOSPITAL COURSE  History of Presenting Illness per Dr. Otoole's H&P:  Nayeli is a very pleasant 69 y.o. male with a past medical history of discogenic disorder, with occasional left-sided leg discomfort presented to the emergency room on 12/23/2019 for evaluation of left lower extremity weakness which started Thursday night, around 1:30 in the morning patient woke up with tremors in addition to having awkward feeling in his head that lasted about 10 seconds, he felt like he was spasming, then he noticed a charley horse in his left proximal thigh area then followed with weakness, decreased sensation.  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  Otherwise denies having any vision changes headaches, chest pain shortness of breath or nausea vomiting.  He did sustain a 1 ground-level fall today which he fell to his knees, denied any head trauma or loss of consciousness.     HOSPITAL COURSE: MRI of the brain and MRA were normal and negative for any vascular or structural abnormality.  He felt better and his symptoms have resolved.  I did recommend that patient start on aspirin 81 mg daily.  Could be exacerbation of his chronic weakness.  At any rate he is feeling better and I have arranged for him to follow-up [facility].    Therefore, he is discharged in good and stable condition to home with close outpatient follow-up.        Discharge Date  12/24/2019    FOLLOW UP ITEMS POST DISCHARGE  Stroke " [facility]    DISCHARGE DIAGNOSES  Principal Problem:    Left-sided weakness POA: Unknown  Active Problems:    Herpes POA: Unknown  Resolved Problems:    * No resolved hospital problems. *      FOLLOW UP  No future appointments.  No follow-up provider specified.    MEDICATIONS ON DISCHARGE     Medication List      CONTINUE taking these medications      Instructions   acyclovir 400 MG tablet  Commonly known as:  ZOVIRAX   Take 400 mg by mouth every day.  Dose:  400 mg     aspirin EC 81 MG Tbec  Commonly known as:  ECOTRIN   Take 1 Tab by mouth every day.  Dose:  81 mg     ibuprofen 200 MG Tabs  Commonly known as:  MOTRIN   Take 800 mg by mouth 2 times a day as needed (PAIN).  Dose:  800 mg            Allergies  No Known Allergies    DIET  Orders Placed This Encounter   Procedures   • Diet Order 2 Gram Sodium     Standing Status:   Standing     Number of Occurrences:   1     Order Specific Question:   Diet:     Answer:   2 Gram Sodium [7]       ACTIVITY  As tolerated.  Weight bearing as tolerated    CONSULTATIONS  None    PROCEDURES  None    LABORATORY  Lab Results   Component Value Date    SODIUM 141 12/24/2019    POTASSIUM 4.1 12/24/2019    CHLORIDE 106 12/24/2019    CO2 24 12/24/2019    GLUCOSE 96 12/24/2019    BUN 18 12/24/2019    CREATININE 0.96 12/24/2019        Lab Results   Component Value Date    WBC 4.5 (L) 12/24/2019    HEMOGLOBIN 14.5 12/24/2019    HEMATOCRIT 42.5 12/24/2019    PLATELETCT 202 12/24/2019

## 2019-12-25 NOTE — PROGRESS NOTES
Monitor summary: Sr/SB= rate 59-98 rare PVC. AZ=0.2 QRS=0.08 and QT=0.36 per strip from monitor room.

## 2019-12-25 NOTE — PROGRESS NOTES
Pt discharged from unit.  Discharge instructions given all questions answered all belongings and written discharge instructions with pt. Pt given copy of MRI per pt and Dr request. No signs of distress. Left unit via WC accompanied by staff.

## 2020-01-03 ENCOUNTER — HOSPITAL ENCOUNTER (EMERGENCY)
Dept: HOSPITAL 8 - ED | Age: 70
Discharge: HOME | End: 2020-01-03
Payer: MEDICARE

## 2020-01-03 VITALS — HEIGHT: 72 IN | WEIGHT: 156.53 LBS | BODY MASS INDEX: 21.2 KG/M2

## 2020-01-03 VITALS — SYSTOLIC BLOOD PRESSURE: 113 MMHG | DIASTOLIC BLOOD PRESSURE: 72 MMHG

## 2020-01-03 DIAGNOSIS — Z00.00: ICD-10-CM

## 2020-01-03 DIAGNOSIS — R41.82: Primary | ICD-10-CM

## 2020-01-03 DIAGNOSIS — R56.9: ICD-10-CM

## 2020-01-03 DIAGNOSIS — M79.605: ICD-10-CM

## 2020-01-03 LAB
ALBUMIN SERPL-MCNC: 4.1 G/DL (ref 3.4–5)
ALP SERPL-CCNC: 44 U/L (ref 45–117)
ALT SERPL-CCNC: 26 U/L (ref 12–78)
ANION GAP SERPL CALC-SCNC: 7 MMOL/L (ref 5–15)
BASOPHILS # BLD AUTO: 0.01 X10^3/UL (ref 0–0.1)
BASOPHILS NFR BLD AUTO: 0 % (ref 0–1)
BILIRUB SERPL-MCNC: 0.7 MG/DL (ref 0.2–1)
CALCIUM SERPL-MCNC: 9.1 MG/DL (ref 8.5–10.1)
CHLORIDE SERPL-SCNC: 108 MMOL/L (ref 98–107)
CREAT SERPL-MCNC: 0.97 MG/DL (ref 0.7–1.3)
CULTURE INDICATED?: NO
EOSINOPHIL # BLD AUTO: 0.03 X10^3/UL (ref 0–0.4)
EOSINOPHIL NFR BLD AUTO: 1 % (ref 1–7)
ERYTHROCYTE [DISTWIDTH] IN BLOOD BY AUTOMATED COUNT: 12.6 % (ref 9.4–14.8)
LYMPHOCYTES # BLD AUTO: 0.98 X10^3/UL (ref 1–3.4)
LYMPHOCYTES NFR BLD AUTO: 17 % (ref 22–44)
MCH RBC QN AUTO: 32.4 PG (ref 27.5–34.5)
MCHC RBC AUTO-ENTMCNC: 33.8 G/DL (ref 33.2–36.2)
MCV RBC AUTO: 95.7 FL (ref 81–97)
MD: NO
MICROSCOPIC: (no result)
MONOCYTES # BLD AUTO: 0.38 X10^3/UL (ref 0.2–0.8)
MONOCYTES NFR BLD AUTO: 6 % (ref 2–9)
NEUTROPHILS # BLD AUTO: 4.51 X10^3/UL (ref 1.8–6.8)
NEUTROPHILS NFR BLD AUTO: 76 % (ref 42–75)
PLATELET # BLD AUTO: 232 X10^3/UL (ref 130–400)
PMV BLD AUTO: 8.6 FL (ref 7.4–10.4)
PROT SERPL-MCNC: 7.2 G/DL (ref 6.4–8.2)
RBC # BLD AUTO: 4.55 X10^6/UL (ref 4.38–5.82)

## 2020-01-03 PROCEDURE — 93005 ELECTROCARDIOGRAM TRACING: CPT

## 2020-01-03 PROCEDURE — 99284 EMERGENCY DEPT VISIT MOD MDM: CPT

## 2020-01-03 PROCEDURE — 83605 ASSAY OF LACTIC ACID: CPT

## 2020-01-03 PROCEDURE — 80307 DRUG TEST PRSMV CHEM ANLYZR: CPT

## 2020-01-03 PROCEDURE — 80053 COMPREHEN METABOLIC PANEL: CPT

## 2020-01-03 PROCEDURE — 85025 COMPLETE CBC W/AUTO DIFF WBC: CPT

## 2020-01-03 PROCEDURE — 81001 URINALYSIS AUTO W/SCOPE: CPT

## 2020-01-03 PROCEDURE — 36415 COLL VENOUS BLD VENIPUNCTURE: CPT

## 2020-01-03 NOTE — NUR
pt ambulatory to ed from home w/ wife. had episode of shaking that lasted 
approx 15 minutes. conscious the whole time. initially unable to talk but then 
able to walk and ask wife for help. +tunnel vision. no dizziness. did not fall. 
12/21 had similar sx (worse) w/ worse weakness on L side. went to [facility], had 
ct/mri/neuro workup, was told he had TIA. PEARRL. moves all extrem. L arm 
slightly weaker but has shoulder injury, sts this is baseilne. L leg weaker but 
sts this has been weak since 12/21 and was worse last week. +sensation. silghtl 
tremors to L arm noted. no drift. speaking full sentences, A&Ox4 gcs 15. nsr 
70s. vss. call bell. hx L5 surgery/discectomy, has some numbness in L leg. 
awaiting md hardy. as

## 2020-02-03 ENCOUNTER — HOSPITAL ENCOUNTER (OUTPATIENT)
Dept: HOSPITAL 8 - CARD | Age: 70
Discharge: HOME | End: 2020-02-03
Attending: PSYCHIATRY & NEUROLOGY
Payer: MEDICARE

## 2020-02-03 DIAGNOSIS — R53.1: ICD-10-CM

## 2020-02-03 DIAGNOSIS — G40.89: Primary | ICD-10-CM

## 2020-02-03 PROCEDURE — 95819 EEG AWAKE AND ASLEEP: CPT

## 2020-03-17 ENCOUNTER — HOSPITAL ENCOUNTER (OUTPATIENT)
Dept: RADIOLOGY | Facility: MEDICAL CENTER | Age: 70
End: 2020-03-17
Attending: NEUROLOGICAL SURGERY
Payer: MEDICARE

## 2020-03-17 DIAGNOSIS — M54.12 CERVICAL RADICULOPATHY: ICD-10-CM

## 2020-03-31 ENCOUNTER — APPOINTMENT (OUTPATIENT)
Dept: RADIOLOGY | Facility: MEDICAL CENTER | Age: 70
End: 2020-03-31
Attending: NEUROLOGICAL SURGERY
Payer: MEDICARE

## 2021-01-05 ENCOUNTER — PRE-ADMISSION TESTING (OUTPATIENT)
Dept: ADMISSIONS | Facility: MEDICAL CENTER | Age: 71
End: 2021-01-05
Attending: SURGERY
Payer: MEDICARE

## 2021-01-05 DIAGNOSIS — Z01.810 PRE-OPERATIVE CARDIOVASCULAR EXAMINATION: ICD-10-CM

## 2021-01-05 DIAGNOSIS — Z01.812 PRE-OPERATIVE LABORATORY EXAMINATION: ICD-10-CM

## 2021-01-05 LAB
EKG IMPRESSION: NORMAL
ERYTHROCYTE [DISTWIDTH] IN BLOOD BY AUTOMATED COUNT: 41.7 FL (ref 35.9–50)
HCT VFR BLD AUTO: 42 % (ref 42–52)
HGB BLD-MCNC: 14.1 G/DL (ref 14–18)
MCH RBC QN AUTO: 31.8 PG (ref 27–33)
MCHC RBC AUTO-ENTMCNC: 33.6 G/DL (ref 33.7–35.3)
MCV RBC AUTO: 94.8 FL (ref 81.4–97.8)
PLATELET # BLD AUTO: 208 K/UL (ref 164–446)
PMV BLD AUTO: 10 FL (ref 9–12.9)
RBC # BLD AUTO: 4.43 M/UL (ref 4.7–6.1)
WBC # BLD AUTO: 6.7 K/UL (ref 4.8–10.8)

## 2021-01-05 PROCEDURE — 93005 ELECTROCARDIOGRAM TRACING: CPT

## 2021-01-05 PROCEDURE — 85027 COMPLETE CBC AUTOMATED: CPT

## 2021-01-05 PROCEDURE — 36415 COLL VENOUS BLD VENIPUNCTURE: CPT

## 2021-01-05 PROCEDURE — 93010 ELECTROCARDIOGRAM REPORT: CPT | Performed by: INTERNAL MEDICINE

## 2021-01-05 RX ORDER — CHLORAL HYDRATE 500 MG
CAPSULE ORAL DAILY
COMMUNITY
End: 2022-03-03

## 2021-01-05 RX ORDER — VITAMIN B COMPLEX
1000 TABLET ORAL DAILY
COMMUNITY

## 2021-01-05 RX ORDER — TRIAMCINOLONE ACETONIDE 5 MG/G
CREAM TOPICAL PRN
COMMUNITY
Start: 2020-10-01 | End: 2023-01-31

## 2021-01-05 ASSESSMENT — FIBROSIS 4 INDEX: FIB4 SCORE: 1.36

## 2021-01-05 NOTE — OR NURSING
We will need a note from patient’s PCP stating that his seizures are well controlled despite not being on any medications.  Thank you.   Jakob Shelby M.D.  [facility] [place]      On Jan 5, 2021, at 12:37, Tricia Guerrero <[email or web address] wrote:  ?    Darling Shelby, this pt is having inguinal hernia repair with Dr. Yee on 1/11.  I’m sending his hx to you due to past seizure history and current neurological history.  He stated his first seizure was 12/19 had a total  of about 8 seizure, between 12/19 to 4/20, none since.  He was on lamotrigine until 2 mos ago.  He was being followed by neurology with Dr. Conklin, but this MD moved to [facility], so in the interim he is followed by Dr. Colton URBAN at [facility].  He is getting a referral from his PCP to see a new neurologist in [place].  He states he has had  about 6 falls in the last 6 months and believes it was related to taking the lamotrigine.  The doctor at [facility] was testing him for “freezing gait”  plus pt states he has had some speech problems.  He denies history of stroke, and he has not had a seizure since 4/20, and none since stopping the lamotrigine.  His PCP is aware of this and I explained to the pt he may need a PCP clearance for surgery, to be sure he is safe from stopping the seizure med.        Anesthesia Summary Report           Patient Name: Girish Tyler MRN: [id number] Admission Date: Patient not admitted   Allergies: No Known Allergies   Low Fall Risk

## 2021-01-05 NOTE — OR NURSING
Pre admit apt: Pt. Instructed to continue regularly prescribed medications through day before surgery.  Instructed to take the following medications, the day of surgery, with a sip of water per anesthesia protocol: acyclovir    Covid test-1/8, Pt given written/verbal instructions to self isolate and report any news sxs of covid to Dr. Yee

## 2021-01-08 ENCOUNTER — PRE-ADMISSION TESTING (OUTPATIENT)
Dept: ADMISSIONS | Facility: MEDICAL CENTER | Age: 71
End: 2021-01-08
Attending: SURGERY
Payer: MEDICARE

## 2021-01-08 DIAGNOSIS — Z01.812 PRE-OPERATIVE LABORATORY EXAMINATION: ICD-10-CM

## 2021-01-08 LAB
COVID ORDER STATUS COVID19: NORMAL
SARS-COV-2 RNA RESP QL NAA+PROBE: NOTDETECTED
SPECIMEN SOURCE: NORMAL

## 2021-01-08 PROCEDURE — C9803 HOPD COVID-19 SPEC COLLECT: HCPCS

## 2021-01-08 PROCEDURE — U0005 INFEC AGEN DETEC AMPLI PROBE: HCPCS

## 2021-01-08 PROCEDURE — U0003 INFECTIOUS AGENT DETECTION BY NUCLEIC ACID (DNA OR RNA); SEVERE ACUTE RESPIRATORY SYNDROME CORONAVIRUS 2 (SARS-COV-2) (CORONAVIRUS DISEASE [COVID-19]), AMPLIFIED PROBE TECHNIQUE, MAKING USE OF HIGH THROUGHPUT TECHNOLOGIES AS DESCRIBED BY CMS-2020-01-R: HCPCS

## 2021-01-10 ENCOUNTER — ANESTHESIA EVENT (OUTPATIENT)
Dept: SURGERY | Facility: MEDICAL CENTER | Age: 71
End: 2021-01-10
Payer: MEDICARE

## 2021-01-11 ENCOUNTER — ANESTHESIA (OUTPATIENT)
Dept: SURGERY | Facility: MEDICAL CENTER | Age: 71
End: 2021-01-11
Payer: MEDICARE

## 2021-01-11 ENCOUNTER — HOSPITAL ENCOUNTER (OUTPATIENT)
Facility: MEDICAL CENTER | Age: 71
End: 2021-01-11
Attending: SURGERY | Admitting: SURGERY
Payer: MEDICARE

## 2021-01-11 VITALS
DIASTOLIC BLOOD PRESSURE: 73 MMHG | RESPIRATION RATE: 16 BRPM | HEART RATE: 75 BPM | OXYGEN SATURATION: 95 % | SYSTOLIC BLOOD PRESSURE: 117 MMHG | TEMPERATURE: 98.1 F | WEIGHT: 152.34 LBS | HEIGHT: 72 IN | BODY MASS INDEX: 20.63 KG/M2

## 2021-01-11 DIAGNOSIS — Z01.812 PRE-OPERATIVE LABORATORY EXAMINATION: ICD-10-CM

## 2021-01-11 DIAGNOSIS — K40.90 LEFT INGUINAL HERNIA: ICD-10-CM

## 2021-01-11 PROCEDURE — 700111 HCHG RX REV CODE 636 W/ 250 OVERRIDE (IP): Performed by: STUDENT IN AN ORGANIZED HEALTH CARE EDUCATION/TRAINING PROGRAM

## 2021-01-11 PROCEDURE — 160041 HCHG SURGERY MINUTES - EA ADDL 1 MIN LEVEL 4: Performed by: SURGERY

## 2021-01-11 PROCEDURE — 501838 HCHG SUTURE GENERAL: Performed by: SURGERY

## 2021-01-11 PROCEDURE — 501664 HCHG TUBING, FILTER STRYKER: Performed by: SURGERY

## 2021-01-11 PROCEDURE — C1781 MESH (IMPLANTABLE): HCPCS | Performed by: SURGERY

## 2021-01-11 PROCEDURE — 160009 HCHG ANES TIME/MIN: Performed by: SURGERY

## 2021-01-11 PROCEDURE — 160035 HCHG PACU - 1ST 60 MINS PHASE I: Performed by: SURGERY

## 2021-01-11 PROCEDURE — 502571 HCHG PACK, LAP CHOLE: Performed by: SURGERY

## 2021-01-11 PROCEDURE — 160029 HCHG SURGERY MINUTES - 1ST 30 MINS LEVEL 4: Performed by: SURGERY

## 2021-01-11 PROCEDURE — 160025 RECOVERY II MINUTES (STATS): Performed by: SURGERY

## 2021-01-11 PROCEDURE — 501577 HCHG TROCAR, STEP 11MM: Performed by: SURGERY

## 2021-01-11 PROCEDURE — 160002 HCHG RECOVERY MINUTES (STAT): Performed by: SURGERY

## 2021-01-11 PROCEDURE — 160048 HCHG OR STATISTICAL LEVEL 1-5: Performed by: SURGERY

## 2021-01-11 PROCEDURE — 700105 HCHG RX REV CODE 258: Performed by: SURGERY

## 2021-01-11 PROCEDURE — 700101 HCHG RX REV CODE 250: Performed by: STUDENT IN AN ORGANIZED HEALTH CARE EDUCATION/TRAINING PROGRAM

## 2021-01-11 PROCEDURE — 700101 HCHG RX REV CODE 250: Performed by: SURGERY

## 2021-01-11 PROCEDURE — 502240 HCHG MISC OR SUPPLY RC 0272: Performed by: SURGERY

## 2021-01-11 PROCEDURE — 160046 HCHG PACU - 1ST 60 MINS PHASE II: Performed by: SURGERY

## 2021-01-11 PROCEDURE — 160047 HCHG PACU  - EA ADDL 30 MINS PHASE II: Performed by: SURGERY

## 2021-01-11 PROCEDURE — 500800 HCHG LAPAROSCOPIC J/L HOOK: Performed by: SURGERY

## 2021-01-11 DEVICE — MESH 3D MAX LEFT LG - (1EA/CA): Type: IMPLANTABLE DEVICE | Site: GROIN | Status: FUNCTIONAL

## 2021-01-11 RX ORDER — SODIUM CHLORIDE, SODIUM LACTATE, POTASSIUM CHLORIDE, CALCIUM CHLORIDE 600; 310; 30; 20 MG/100ML; MG/100ML; MG/100ML; MG/100ML
INJECTION, SOLUTION INTRAVENOUS CONTINUOUS
Status: ACTIVE | OUTPATIENT
Start: 2021-01-11 | End: 2021-01-11

## 2021-01-11 RX ORDER — BUPIVACAINE HYDROCHLORIDE AND EPINEPHRINE 2.5; 5 MG/ML; UG/ML
INJECTION, SOLUTION EPIDURAL; INFILTRATION; INTRACAUDAL; PERINEURAL
Status: DISCONTINUED | OUTPATIENT
Start: 2021-01-11 | End: 2021-01-11 | Stop reason: HOSPADM

## 2021-01-11 RX ORDER — PHENYLEPHRINE HYDROCHLORIDE 10 MG/ML
INJECTION, SOLUTION INTRAMUSCULAR; INTRAVENOUS; SUBCUTANEOUS PRN
Status: DISCONTINUED | OUTPATIENT
Start: 2021-01-11 | End: 2021-01-11 | Stop reason: SURG

## 2021-01-11 RX ORDER — OXYCODONE HYDROCHLORIDE 5 MG/1
5 TABLET ORAL EVERY 4 HOURS PRN
Qty: 24 TAB | Refills: 0 | Status: SHIPPED | OUTPATIENT
Start: 2021-01-11 | End: 2021-01-15

## 2021-01-11 RX ORDER — KETOROLAC TROMETHAMINE 30 MG/ML
30 INJECTION, SOLUTION INTRAMUSCULAR; INTRAVENOUS ONCE
Status: COMPLETED | OUTPATIENT
Start: 2021-01-11 | End: 2021-01-11

## 2021-01-11 RX ORDER — OXYCODONE HCL 5 MG/5 ML
5 SOLUTION, ORAL ORAL
Status: DISCONTINUED | OUTPATIENT
Start: 2021-01-11 | End: 2021-01-11 | Stop reason: HOSPADM

## 2021-01-11 RX ORDER — OXYCODONE HCL 5 MG/5 ML
10 SOLUTION, ORAL ORAL
Status: DISCONTINUED | OUTPATIENT
Start: 2021-01-11 | End: 2021-01-11 | Stop reason: HOSPADM

## 2021-01-11 RX ORDER — ROCURONIUM BROMIDE 10 MG/ML
INJECTION, SOLUTION INTRAVENOUS PRN
Status: DISCONTINUED | OUTPATIENT
Start: 2021-01-11 | End: 2021-01-11 | Stop reason: SURG

## 2021-01-11 RX ORDER — DEXAMETHASONE SODIUM PHOSPHATE 4 MG/ML
INJECTION, SOLUTION INTRA-ARTICULAR; INTRALESIONAL; INTRAMUSCULAR; INTRAVENOUS; SOFT TISSUE PRN
Status: DISCONTINUED | OUTPATIENT
Start: 2021-01-11 | End: 2021-01-11 | Stop reason: SURG

## 2021-01-11 RX ORDER — ONDANSETRON 2 MG/ML
INJECTION INTRAMUSCULAR; INTRAVENOUS PRN
Status: DISCONTINUED | OUTPATIENT
Start: 2021-01-11 | End: 2021-01-11 | Stop reason: SURG

## 2021-01-11 RX ORDER — HYDROMORPHONE HYDROCHLORIDE 1 MG/ML
0.4 INJECTION, SOLUTION INTRAMUSCULAR; INTRAVENOUS; SUBCUTANEOUS
Status: DISCONTINUED | OUTPATIENT
Start: 2021-01-11 | End: 2021-01-11 | Stop reason: HOSPADM

## 2021-01-11 RX ORDER — HYDROMORPHONE HYDROCHLORIDE 1 MG/ML
0.2 INJECTION, SOLUTION INTRAMUSCULAR; INTRAVENOUS; SUBCUTANEOUS
Status: DISCONTINUED | OUTPATIENT
Start: 2021-01-11 | End: 2021-01-11 | Stop reason: HOSPADM

## 2021-01-11 RX ORDER — OXYCODONE HCL 5 MG/5 ML
5-10 SOLUTION, ORAL ORAL EVERY 4 HOURS PRN
Status: DISCONTINUED | OUTPATIENT
Start: 2021-01-11 | End: 2021-01-11 | Stop reason: HOSPADM

## 2021-01-11 RX ORDER — ONDANSETRON 2 MG/ML
4 INJECTION INTRAMUSCULAR; INTRAVENOUS
Status: DISCONTINUED | OUTPATIENT
Start: 2021-01-11 | End: 2021-01-11 | Stop reason: HOSPADM

## 2021-01-11 RX ORDER — CEFAZOLIN SODIUM 1 G/3ML
INJECTION, POWDER, FOR SOLUTION INTRAMUSCULAR; INTRAVENOUS PRN
Status: DISCONTINUED | OUTPATIENT
Start: 2021-01-11 | End: 2021-01-11 | Stop reason: SURG

## 2021-01-11 RX ORDER — MEPERIDINE HYDROCHLORIDE 25 MG/ML
12.5 INJECTION INTRAMUSCULAR; INTRAVENOUS; SUBCUTANEOUS
Status: DISCONTINUED | OUTPATIENT
Start: 2021-01-11 | End: 2021-01-11 | Stop reason: HOSPADM

## 2021-01-11 RX ORDER — HYDROMORPHONE HYDROCHLORIDE 1 MG/ML
0.1 INJECTION, SOLUTION INTRAMUSCULAR; INTRAVENOUS; SUBCUTANEOUS
Status: DISCONTINUED | OUTPATIENT
Start: 2021-01-11 | End: 2021-01-11 | Stop reason: HOSPADM

## 2021-01-11 RX ORDER — HYDROMORPHONE HYDROCHLORIDE 2 MG/ML
INJECTION, SOLUTION INTRAMUSCULAR; INTRAVENOUS; SUBCUTANEOUS PRN
Status: DISCONTINUED | OUTPATIENT
Start: 2021-01-11 | End: 2021-01-11 | Stop reason: SURG

## 2021-01-11 RX ORDER — DIPHENHYDRAMINE HYDROCHLORIDE 50 MG/ML
12.5 INJECTION INTRAMUSCULAR; INTRAVENOUS
Status: DISCONTINUED | OUTPATIENT
Start: 2021-01-11 | End: 2021-01-11 | Stop reason: HOSPADM

## 2021-01-11 RX ORDER — HALOPERIDOL 5 MG/ML
1 INJECTION INTRAMUSCULAR
Status: DISCONTINUED | OUTPATIENT
Start: 2021-01-11 | End: 2021-01-11 | Stop reason: HOSPADM

## 2021-01-11 RX ADMIN — DEXAMETHASONE SODIUM PHOSPHATE 4 MG: 4 INJECTION, SOLUTION INTRAMUSCULAR; INTRAVENOUS at 11:04

## 2021-01-11 RX ADMIN — HYDROMORPHONE HYDROCHLORIDE 0.4 MG: 2 INJECTION, SOLUTION INTRAMUSCULAR; INTRAVENOUS; SUBCUTANEOUS at 11:43

## 2021-01-11 RX ADMIN — FENTANYL CITRATE 100 MCG: 50 INJECTION, SOLUTION INTRAMUSCULAR; INTRAVENOUS at 11:01

## 2021-01-11 RX ADMIN — ROCURONIUM BROMIDE 10 MG: 10 INJECTION, SOLUTION INTRAVENOUS at 11:22

## 2021-01-11 RX ADMIN — PHENYLEPHRINE HYDROCHLORIDE 200 MCG: 10 INJECTION INTRAVENOUS at 11:57

## 2021-01-11 RX ADMIN — LIDOCAINE HYDROCHLORIDE 0.5 ML: 10 INJECTION, SOLUTION INFILTRATION; PERINEURAL at 09:17

## 2021-01-11 RX ADMIN — ONDANSETRON 4 MG: 2 INJECTION INTRAMUSCULAR; INTRAVENOUS at 11:04

## 2021-01-11 RX ADMIN — KETOROLAC TROMETHAMINE 30 MG: 30 INJECTION, SOLUTION INTRAMUSCULAR; INTRAVENOUS at 12:23

## 2021-01-11 RX ADMIN — HYDROMORPHONE HYDROCHLORIDE 0.6 MG: 2 INJECTION, SOLUTION INTRAMUSCULAR; INTRAVENOUS; SUBCUTANEOUS at 11:07

## 2021-01-11 RX ADMIN — ROCURONIUM BROMIDE 50 MG: 10 INJECTION, SOLUTION INTRAVENOUS at 11:01

## 2021-01-11 RX ADMIN — PROPOFOL 150 MG: 10 INJECTION, EMULSION INTRAVENOUS at 11:01

## 2021-01-11 RX ADMIN — WATER 15 ML: 100 IRRIGANT IRRIGATION at 09:15

## 2021-01-11 RX ADMIN — SODIUM CHLORIDE, POTASSIUM CHLORIDE, SODIUM LACTATE AND CALCIUM CHLORIDE: 600; 310; 30; 20 INJECTION, SOLUTION INTRAVENOUS at 09:18

## 2021-01-11 RX ADMIN — PHENYLEPHRINE HYDROCHLORIDE 200 MCG: 10 INJECTION INTRAVENOUS at 11:20

## 2021-01-11 RX ADMIN — HYDROMORPHONE HYDROCHLORIDE 0.4 MG: 2 INJECTION, SOLUTION INTRAMUSCULAR; INTRAVENOUS; SUBCUTANEOUS at 11:58

## 2021-01-11 RX ADMIN — PHENYLEPHRINE HYDROCHLORIDE 200 MCG: 10 INJECTION INTRAVENOUS at 11:37

## 2021-01-11 RX ADMIN — CEFAZOLIN 2 G: 1 INJECTION, POWDER, FOR SOLUTION INTRAVENOUS at 11:03

## 2021-01-11 ASSESSMENT — PAIN DESCRIPTION - PAIN TYPE: TYPE: SURGICAL PAIN

## 2021-01-11 ASSESSMENT — FIBROSIS 4 INDEX: FIB4 SCORE: 1.32

## 2021-01-11 ASSESSMENT — PAIN SCALES - GENERAL: PAIN_LEVEL: 0

## 2021-01-11 NOTE — ANESTHESIA PROCEDURE NOTES
Airway    Date/Time: 1/11/2021 11:02 AM  Performed by: To Quarles M.D.  Authorized by: To Quarles M.D.     Location:  OR  Urgency:  Elective  Indications for Airway Management:  Anesthesia      Spontaneous Ventilation: absent    Sedation Level:  Deep  Preoxygenated: Yes    Patient Position:  Sniffing  Mask Difficulty Assessment:  1 - vent by mask  Final Airway Type:  Endotracheal airway  Final Endotracheal Airway:  ETT  Cuffed: Yes    Technique Used for Successful ETT Placement:  Direct laryngoscopy    Insertion Site:  Oral  Blade Type:  Gray  Laryngoscope Blade/Videolaryngoscope Blade Size:  2  ETT Size (mm):  8.0  Leak Pressue (cm H2O):  20  Measured from:  Teeth  ETT to Teeth (cm):  24  Placement Verified by: auscultation and capnometry    Cormack-Lehane Classification:  Grade IIa - partial view of glottis  Number of Attempts at Approach:  1  Ventilation Between Attempts:  None  Number of Other Approaches Attempted:  0

## 2021-01-11 NOTE — OR NURSING
1207: To PACU from OR via gurney, sleeping, respirations spontaneous and non-labored via OPA. Abdo flat, x 3 midline lap sites w/o dressings or drainage.  1212: Rouses spontaneously, denies pain/nausea. Icepack applied over gown to abdo.    1220: Sitting up sipping water, O2 d/lucy, continues to deny pain, plan Torodol in preparation for ambulation.  1235: No change in surgical site assessment. Meets criteria to transfer to Stage 2

## 2021-01-11 NOTE — DISCHARGE INSTRUCTIONS
ACTIVITY: Rest and take it easy for the first 24 hours.  A responsible adult is recommended to remain with you during that time.  It is normal to feel sleepy.  We encourage you to not do anything that requires balance, judgment or coordination.    MILD FLU-LIKE SYMPTOMS ARE NORMAL. YOU MAY EXPERIENCE GENERALIZED MUSCLE ACHES, THROAT IRRITATION, HEADACHE AND/OR SOME NAUSEA.    FOR 24 HOURS DO NOT:  Drive, operate machinery or run household appliances.  Drink beer or alcoholic beverages.   Make important decisions or sign legal documents.    SPECIAL INSTRUCTIONS: DO NOT soak/immerse wounds (no swimming, hot tubs, baths etc) until cleared by surgeon. May shower and wash gently with soap/water starting tomorrow.     DIET: To avoid nausea, slowly advance diet as tolerated, avoiding spicy or greasy foods for the first day.  Add more substantial food to your diet according to your physician's instructions. INCREASE FLUIDS AND FIBER TO AVOID CONSTIPATION.    FOLLOW-UP APPOINTMENT:  A follow-up appointment should be arranged with your doctor; call to schedule.    You should CALL YOUR PHYSICIAN if you develop:  Fever greater than 101 degrees F.  Pain not relieved by medication, or persistent nausea or vomiting.  Excessive bleeding (blood soaking through dressing) or unexpected drainage from the wound.  Extreme redness or swelling around the incision site, drainage of pus or foul smelling drainage.  Inability to urinate or empty your bladder within 8 hours.  Problems with breathing or chest pain.    You should call 911 if you develop problems with breathing or chest pain.  If you are unable to contact your doctor or surgical center, you should go to the nearest emergency room or urgent care center.  Physician's telephone #: [phone]    If any questions arise, call your doctor.  If your doctor is not available, please feel free to call the Surgical Center at [phone]. The Contact Center is open Monday through Friday 7AM  to 5PM and may speak to a nurse at [phone], or toll free at [phone].     A registered nurse may call you a few days after your surgery to see how you are doing after your procedure.    MEDICATIONS: Resume taking daily medication.  Take prescribed pain medication with food.  If no medication is prescribed, you may take non-aspirin pain medication if needed.  PAIN MEDICATION CAN BE VERY CONSTIPATING.  Take a stool softener or laxative such as senokot, pericolace, or milk of magnesia if needed.    Prescription given for oxycodone.  Last pain medication given at ______________________.    If your physician has prescribed pain medication that includes Acetaminophen (Tylenol), do not take additional Acetaminophen (Tylenol) while taking the prescribed medication.    Depression / Suicide Risk    As you are discharged from this Critical access hospital facility, it is important to learn how to keep safe from harming yourself.    Recognize the warning signs:  · Abrupt changes in personality, positive or negative- including increase in energy   · Giving away possessions  · Change in eating patterns- significant weight changes-  positive or negative  · Change in sleeping patterns- unable to sleep or sleeping all the time   · Unwillingness or inability to communicate  · Depression  · Unusual sadness, discouragement and loneliness  · Talk of wanting to die  · Neglect of personal appearance   · Rebelliousness- reckless behavior  · Withdrawal from people/activities they love  · Confusion- inability to concentrate     If you or a loved one observes any of these behaviors or has concerns about self-harm, here's what you can do:  · Talk about it- your feelings and reasons for harming yourself  · Remove any means that you might use to hurt yourself (examples: pills, rope, extension cords, firearm)  · Get professional help from the community (Mental Health, Substance Abuse, psychological counseling)  · Do not be alone:Call your Safe  Contact- someone whom you trust who will be there for you.  · Call your local CRISIS HOTLINE 723-9417 or 897-844-3246  · Call your local Children's Mobile Crisis Response Team [place] (054) 630-6512 or www.[employer]  · Call the toll free National Suicide Prevention Hotlines   · National Suicide Prevention Lifeline 272-817-MKWX (8284)  · National [employer] Line Network 800-SUICIDE (892-9314)

## 2021-01-11 NOTE — OP REPORT
DATE OF OPERATION: 1/11/2021     PREOPERATIVE DIAGNOSIS: Inguinal Hernia    POSTOPERATIVE DIAGNOSIS: Left indirect inguinal hernia    PROCEDURE:  Laparoscopic Inguinal  Hernia Repair    SURGEON: Chuy Yee M.D. MD    ASSISTANT:     ANESTHESIOLOGIST:     ANESTHESIA: General      INDICATIONS: The patient is a 70 y.o. male with a symptomatic inguinal hernia.   Procedure, alternatives and risks were discussed with the patient or guardian.  Specifically discussed where risk of bleeding, infection, injury to regional nerves and blood vessels and hernia recurrence and injury to testicle. .  Development of chronic pain was also discussed .  Questions were answered in the office and pre-op area as well.      PROCEDURE: Following consent, the patient was properly identified and optimally positioned.   He   OPERATION: Abdomen, groin and genitalia were prepped and draped in sterile   fashion. The infraumbilical incision was made using a 15 blade and the rectus   sheath incised. The rectus muscle was  from the peritoneum   posteriorly and then the inguinal space was developed using a balloon   inflator. A balloon Edvin trocar was then inserted and inflated with 15 mm   of pressurized CO2. The hernia  sac was then reduced using blunt and sharp dissection.   Care was taken to avoid injury to the blood vessels or vas deferens. Once the   sac had been reduced, a Bard 3D mesh was inserted.   The mesh covered the defect nicely and was secure.   There was no active hemorrhage at the time of closure. The mesh was tacked to brennon ligament using absorbable tacks.    The space was deflated with the sac elevated away from the inguinal canal.   The fascia was closed using 0 Vicryl. Wounds were irrigated. Skin was closed   using running 4-0 Monocryl subcuticular suture. Sterile dressing was   applied. Patient tolerated the procedure well; was taken to recovery room in   stable condition.   Estimated Blood Loss:   Minimal  Specimens to pathology:  None  Condition to PAR: Stable      ____________________________________   Chuy Yee M.D.      DD: 1/11/2021  11:08 AM

## 2021-01-11 NOTE — ANESTHESIA POSTPROCEDURE EVALUATION
Patient: Girish Tyler    Procedure Summary     Date: 01/11/21 Room / Location:  OR 01 / SUR[facility]    Anesthesia Start: 1057 Anesthesia Stop: 1217    Procedure: REPAIR, HERNIA, INGUINAL, LAPAROSCOPIC (Left Groin) Diagnosis: (INGUINAL HERNIA, LEFT, REDUCIBLE)    Surgeons: Chuy Yee M.D. Responsible Provider: To Quarles M.D.    Anesthesia Type: general ASA Status: 1          Final Anesthesia Type: general  Last vitals  BP   Blood Pressure : 117/73, NIBP: 129/72    Temp   36.7 °C (98.1 °F)    Pulse   Pulse: 75   Resp   16    SpO2   95 %      Anesthesia Post Evaluation    Patient location during evaluation: PACU  Patient participation: complete - patient participated  Level of consciousness: awake and alert  Pain score: 0    Airway patency: patent  Anesthetic complications: no  Cardiovascular status: hemodynamically stable  Respiratory status: acceptable  Hydration status: euvolemic    PONV: none           Nurse Pain Score: 0 (NPRS)

## 2021-01-11 NOTE — ANESTHESIA TIME REPORT
Anesthesia Start and Stop Event Times     Date Time Event    1/11/2021 1057 Anesthesia Start     1217 Anesthesia Stop        Responsible Staff  01/11/21    Name Role Begin End    To Quarles M.D. Anesth 1057 1217        Preop Diagnosis (Free Text):  Pre-op Diagnosis     INGUINAL HERNIA, LEFT, REDUCIBLE        Preop Diagnosis (Codes):    Post op Diagnosis  Left inguinal hernia      Premium Reason  Non-Premium    Comments:

## 2021-03-03 DIAGNOSIS — Z23 NEED FOR VACCINATION: ICD-10-CM

## 2021-12-27 RX ORDER — VALACYCLOVIR HYDROCHLORIDE 500 MG/1
500 TABLET, FILM COATED ORAL DAILY
COMMUNITY
End: 2021-12-27 | Stop reason: SDUPTHER

## 2021-12-27 RX ORDER — VALACYCLOVIR HYDROCHLORIDE 500 MG/1
500 TABLET, FILM COATED ORAL DAILY
Qty: 90 TABLET | Refills: 3 | Status: SHIPPED | OUTPATIENT
Start: 2021-12-27 | End: 2022-11-30 | Stop reason: SDUPTHER

## 2021-12-27 NOTE — TELEPHONE ENCOUNTER
VOICEMAIL  1. Caller Name: Girish                         Call Back Number: [phone]    2. Message: Pt called and left  on 12/22/2021 asking if Dr. Barragan could send in a RD for his Valacyclovir 500mg one daily to [facility] on [address] on [address]     3. Patient approves office to leave a detailed voicemail/MyChart message: N\A      I called the patient back and spoke to him. I apologized for the delay in the office getting back to him and I stated that I will send his refill request to the provider for review.

## 2021-12-27 NOTE — TELEPHONE ENCOUNTER
Received request via: Patient    Was the patient seen in the last year in this department? Yes 09/07/2021 verified in [facility].     Does the patient have an active prescription (recently filled or refills available) for medication(s) requested? No

## 2022-01-06 ENCOUNTER — TELEPHONE (OUTPATIENT)
Dept: MEDICAL GROUP | Facility: CLINIC | Age: 72
End: 2022-01-06

## 2022-01-06 DIAGNOSIS — M54.50 CHRONIC LOW BACK PAIN, UNSPECIFIED BACK PAIN LATERALITY, UNSPECIFIED WHETHER SCIATICA PRESENT: ICD-10-CM

## 2022-01-06 DIAGNOSIS — G89.29 CHRONIC LOW BACK PAIN, UNSPECIFIED BACK PAIN LATERALITY, UNSPECIFIED WHETHER SCIATICA PRESENT: ICD-10-CM

## 2022-01-06 NOTE — TELEPHONE ENCOUNTER
Jose Raul state Dr. Barragan send a referral for Epidural inj. But he does not like the Doctor and the Patient care.. Patient request to be send with somebody j luis.

## 2022-01-11 NOTE — TELEPHONE ENCOUNTER
Who has patient seen before that he does not want to be seen by again?     Dr. Barragan will initiate a referral to another physician, once this name is known.

## 2022-02-16 ENCOUNTER — APPOINTMENT (OUTPATIENT)
Dept: PHYSICAL MEDICINE AND REHAB | Facility: MEDICAL CENTER | Age: 72
End: 2022-02-16
Payer: MEDICARE

## 2022-03-03 ENCOUNTER — OFFICE VISIT (OUTPATIENT)
Dept: PHYSICAL MEDICINE AND REHAB | Facility: MEDICAL CENTER | Age: 72
End: 2022-03-03
Payer: MEDICARE

## 2022-03-03 VITALS
DIASTOLIC BLOOD PRESSURE: 86 MMHG | HEIGHT: 72 IN | WEIGHT: 165.57 LBS | HEART RATE: 76 BPM | OXYGEN SATURATION: 96 % | BODY MASS INDEX: 22.43 KG/M2 | TEMPERATURE: 97.7 F | SYSTOLIC BLOOD PRESSURE: 128 MMHG

## 2022-03-03 DIAGNOSIS — G23.1 PROGRESSIVE SUPRANUCLEAR PALSY (HCC): ICD-10-CM

## 2022-03-03 DIAGNOSIS — M51.36 BULGE OF LUMBAR DISC WITHOUT MYELOPATHY: ICD-10-CM

## 2022-03-03 DIAGNOSIS — M47.816 LUMBAR FACET ARTHROPATHY: ICD-10-CM

## 2022-03-03 DIAGNOSIS — M51.36 DDD (DEGENERATIVE DISC DISEASE), LUMBAR: ICD-10-CM

## 2022-03-03 DIAGNOSIS — M54.50 LUMBOSACRAL PAIN: ICD-10-CM

## 2022-03-03 DIAGNOSIS — M53.3 SACROILIAC JOINT DYSFUNCTION OF RIGHT SIDE: ICD-10-CM

## 2022-03-03 PROBLEM — M25.551 PAIN OF RIGHT HIP JOINT: Status: ACTIVE | Noted: 2021-06-18

## 2022-03-03 PROBLEM — Z82.49 FAMILY HISTORY OF CORONARY ARTERY DISEASE: Status: ACTIVE | Noted: 2020-11-30

## 2022-03-03 PROCEDURE — 99204 OFFICE O/P NEW MOD 45 MIN: CPT | Performed by: PHYSICAL MEDICINE & REHABILITATION

## 2022-03-03 ASSESSMENT — PAIN SCALES - GENERAL: PAINLEVEL: 2=MINIMAL-SLIGHT

## 2022-03-03 ASSESSMENT — PATIENT HEALTH QUESTIONNAIRE - PHQ9: CLINICAL INTERPRETATION OF PHQ2 SCORE: 0

## 2022-03-03 NOTE — PROGRESS NOTES
New patient note    Physiatry (physical medicine and  Rehabilitation), interventional spine and sports medicine    Date of Service: 3/3/2022    Chief complaint:   Chief Complaint   Patient presents with   • New Patient     Back pain       HISTORY    HPI: Girish Tyler 71 y.o. male with history of progressive supranuclear palsy who presents today for evaluation of pain complaints related to low back pain and left leg weakness.  He reports that he had surgical management for a Tarlov cyst in 2015 in [place].  He has previously had treatment with .  The patient reports some right-sided posterior gluteal hip pain/tightness.    In the last year he reports that he has had some buckling of his left leg when he is multitasking.  This is not painful.  He has had steroid injections in the past which has been helpful.  He thinks the last might have been around 2019.    No bowel or bladder changes.  No changes in symptoms with coughing or sneezing.  Sitting seems to relieve his symptoms and standing for long period of time seem to aggravate.  Pain is around 7 out of 10 on the NRS.    He reports that he has some difficulty with ambulation and dystonia.  He has had side effects to medications including baclofen and levodopa which increased fatigue.    He sees Dr. Adam Boxer at [facility] for symptoms related to progressive supranuclear palsy.  He is currently in physical therapy at [facility] with Radha Alejo for his progressive supranuclear palsy and related symptoms.    He is accompanied by his significant other, Kellee.      Medical records review:  I reviewed the note from the referring provider Veronica Barragan M.  They placed this referral after messages    Review of records from [facility] neurology.  Visit August 19, 2021 with Dr. Adam Boxer.  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  Noted to have history of possible seizure disorder.  Note of freezing gait September  "2020.  History of gait disturbance after Tarlov cyst repair in 2015 with note of \"some nerve damage and some proximal muscle weakness in his hip flexors \"noted to fall once or twice a week with note of reduction in falls with physical therapy.  Noted in assessment to have approximately 2-year history of freezing gait, gait instability and falls, moderate Prascion behavior changes with functional consequences of movement disorder.  Plan to follow-up in 6 months with plan for physical therapy occupational therapy and speech therapy    Previous treatments:    Physical Therapy: Yes, still goes once a week    Medications the patient is tried: ibuprofen 800mg , every other day    Previous interventions: previous epidurals    Previous surgeries to relieve the above pain:  None, recently      ROS:   Red Flags ROS:   Fever, Chills, Sweats: Denies  Involuntary Weight Loss: Denies  Bladder Incontinence: Denies  Bowel Incontinence: Denies  Saddle Anesthesia: Denies    All other systems reviewed and negative.       PMHx:   Past Medical History:   Diagnosis Date   • Arthritis     shoulders-osteo   • Gait abnormality 01/2021    Pt is being worked up for \"freezing of gait\"  may be related to seizure meds recently taking   • High cholesterol     No meds, monitoring.   • History of anemia 01/2021    No a current issue   • Seizure (HCC) 01/05/2021    Doesn't lose consciousness,  was on lamotrigine stopped 2 mos ago per neurologist inst.approx 8 total seizures  12/2019- 4/20, last seizure, followed by Dr. Conklin, now  present  neurologist @ [facility]        PSHx:   Past Surgical History:   Procedure Laterality Date   • INGUINAL HERNIA LAPAROSCOPIC Left 1/11/2021    Procedure: REPAIR, HERNIA, INGUINAL, LAPAROSCOPIC;  Surgeon: Chuy Yee M.D.;  Location: SURGERY [facility];  Service: General   • CYSTECTOMY  2015    Tarlov cyst repair S2, discetomy L5   • INGUINAL HERNIA REPAIR Right 2005   • HERNIA REPAIR Right 1998    Inguinal "       Family history   History reviewed. No pertinent family history.      Medications:   Current Outpatient Medications   Medication   • valACYclovir (VALTREX) 500 MG Tab   • Multiple Vitamins-Minerals (MULTIVITAMIN ADULT PO)   • vitamin D (CHOLECALCIFEROL) 1000 Unit (25 mcg) Tab   • triamcinolone acetonide (KENALOG) 0.5 % Cream   • Vitamin A 2250 MCG (7500 UT) Cap   • Misc Natural Products (CHLORELLA) 500 MG Cap   • diclofenac sodium 1 % Gel   • acyclovir (ZOVIRAX) 400 MG tablet   • ibuprofen (MOTRIN) 200 MG Tab   • Multiple Vitamin (MULTI-VITAMIN) Tab     No current facility-administered medications for this visit.       Allergies:   No Known Allergies    Social Hx:   Social History     Socioeconomic History   • Marital status:      Spouse name: Not on file   • Number of children: Not on file   • Years of education: Not on file   • Highest education level: Not on file   Occupational History   • Not on file   Tobacco Use   • Smoking status: Never Smoker   • Smokeless tobacco: Never Used   Vaping Use   • Vaping Use: Never used   Substance and Sexual Activity   • Alcohol use: Yes     Comment: 1-4 per week   • Drug use: Not Currently   • Sexual activity: Not on file   Other Topics Concern   •  Service No   • Blood Transfusions No   • Caffeine Concern No   • Occupational Exposure No   • Hobby Hazards No   • Sleep Concern Yes   • Stress Concern Yes   • Weight Concern No   • Special Diet No   • Back Care No   • Exercise Yes   • Bike Helmet No   • Seat Belt Yes   • Self-Exams No   Social History Narrative   • Not on file     Social Determinants of Health     Financial Resource Strain: Not on file   Food Insecurity: Not on file   Transportation Needs: Not on file   Physical Activity: Not on file   Stress: Not on file   Social Connections: Not on file   Intimate Partner Violence: Not on file   Housing Stability: Not on file         EXAMINATION     Physical Exam:   Vitals: /86 (BP Location: Right arm,  Patient Position: Sitting, BP Cuff Size: Small adult)   Pulse 76   Temp 36.5 °C (97.7 °F) (Temporal)   Ht 1.829 m (6')   Wt 75.1 kg (165 lb 9.1 oz)   SpO2 96%     Constitutional:   Body Habitus: Body mass index is 22.45 kg/m².  Cooperation: Fully cooperates with exam  Appearance: Well-groomed, well-nourished, not disheveled, in no acute distress    Eyes: No scleral icterus, no proptosis     ENT -no obvious auditory deficits, wearing a face mask    Skin -no rashes or lesions noted     Respiratory-  breathing comfortable on room air, no audible wheezing    Cardiovascular- capillary refills less than 2 seconds. No lower extremity edema is noted.     Psychiatric- alert and oriented ×3. Normal affect.     Gait -mild freezing gait, no use of ambulatory device.  Unable to perform heel or toe walking.     Musculoskeletal -     Thoracic/Lumbar Spine/Sacral Spine/Hips   Inspection: No evidence of atrophy in bilateral lower extremities throughout     ROM: decreased AROM with flexion, extension, lateral flexion bilaterally, with pain     Palpation:   No tenderness to palpation in midline at T1-T12 levels. No tenderness to palpation in the left and right of the midline T1-L5  palpation over SI joint: positive right, negative left    palpation over buttock: positive right, negative left    palpation in hip or over the greater trochanters: negative bilaterally      Lumbar spine Special tests  Neuro tension  Straight leg test negative bilaterally      HIP  Range of motion in the hips is mildly decreased bilaterally, possibly time related as this improved slightly bilaterally with repetition    SI joint tests  Observation patient sits on one buttocks: Negative  DIPAK test positive on the right and negative on the left    Left knee  No crepitus or effusion.  No medial or lateral joint line tenderness.  Negative Lachman's      Neuro       Key points for the international standards for neurological classification of spinal cord  injury (ISNCSCI) to light touch.     Dermatome R L   L2 2 2   L3 2 2   L4 2 2   L5 2 2   S1 2 2   S2 2 2       Motor Exam Lower Extremities    ? Myotome R L   Hip flexion L2 5 5   Knee extension L3 5 5   Ankle dorsiflexion L4 5 5   Toe extension L5 5 5   Ankle plantarflexion S1 5 5       Romero’s sign negative right, positive left   Babinski sign negative bilaterally   Clonus of the ankle negative bilaterally     Reflexes  ?  R L   Biceps  2+ 2+   Brachioradialis  2+ 2+   Patella  2+ 2+   Achilles   2+ 2+       MEDICAL DECISION MAKING    Medical records review: see under HPI section.     DATA    Labs:   Lab Results   Component Value Date/Time    SODIUM 141 12/24/2019 05:05 AM    POTASSIUM 4.1 12/24/2019 05:05 AM    CHLORIDE 106 12/24/2019 05:05 AM    CO2 24 12/24/2019 05:05 AM    ANION 11.0 12/24/2019 05:05 AM    GLUCOSE 96 12/24/2019 05:05 AM    BUN 18 12/24/2019 05:05 AM    CREATININE 0.96 12/24/2019 05:05 AM    CALCIUM 8.9 12/24/2019 05:05 AM    ASTSGOT 14 12/24/2019 05:05 AM    ALTSGPT 12 12/24/2019 05:05 AM    TBILIRUBIN 0.9 12/24/2019 05:05 AM    ALBUMIN 4.0 12/24/2019 05:05 AM    TOTPROTEIN 6.5 12/24/2019 05:05 AM    GLOBULIN 2.5 12/24/2019 05:05 AM    AGRATIO 1.6 12/24/2019 05:05 AM       No results found for: PROTHROMBTM, INR     Lab Results   Component Value Date/Time    WBC 6.7 01/05/2021 12:15 PM    RBC 4.43 (L) 01/05/2021 12:15 PM    HEMOGLOBIN 14.1 01/05/2021 12:15 PM    HEMATOCRIT 42.0 01/05/2021 12:15 PM    MCV 94.8 01/05/2021 12:15 PM    MCH 31.8 01/05/2021 12:15 PM    MCHC 33.6 (L) 01/05/2021 12:15 PM    MPV 10.0 01/05/2021 12:15 PM    NEUTSPOLYS 55.40 12/24/2019 05:05 AM    LYMPHOCYTES 31.60 12/24/2019 05:05 AM    MONOCYTES 10.60 12/24/2019 05:05 AM    EOSINOPHILS 1.80 12/24/2019 05:05 AM    BASOPHILS 0.40 12/24/2019 05:05 AM        Lab Results   Component Value Date/Time    HBA1C 5.2 12/23/2019 12:40 PM        Imaging: I personally reviewed following images, these are my reads  MRI lumbar spine  12/23/2019  At L1-2, no central or foraminal stenosis  At L2-3, mild disc bulge, borderline foraminal narrowing bilaterally  At L3-4, disc bulge without central canal stenosis, mild left recess stenosis, mild foraminal stenosis on the left  At L4-5, disc bulge, facet arthropathy, ligamentum flavum thickening without central canal stenosis.  Mild to moderate foraminal stenosis bilaterally  At L5-S1, moderate disc height loss with central disc bulge and left paracentral disc extrusion and mild foraminal stenosis bilaterally.  No central canal stenosis      IMAGING radiology reads. I reviewed the following radiology reads      Results for orders placed during the hospital encounter of 12/23/19    MR-THORACIC SPINE-W/O    Impression  Multilevel degenerative disc disease results in at most cord abutment right of midline at T7/8. No zohaib central stenosis.    Chronic mid thoracic anterior wedge compression deformities result in slightly exaggerated thoracic kyphosis. No acute fracture is seen    Results for orders placed during the hospital encounter of 12/23/19    MR-LUMBAR SPINE-W/O    Impression  Interval improvement in L4/5/S1 disc extrusion/protrusion    Scattered areas of discogenic edema as detailed above    No other change is identified and there is overall moderate spondylosis with degenerative disc disease, facet arthropathy and ligamentum flavum redundancy as detailed above    Greatest stenosis is right neural foraminal at L4/5, mild-moderate    Nose zohaib central stenosis is seen but the left transiting L4 nerve roots are abutted in the left L3/4 lateral recess                                                                                  Diagnosis   Visit Diagnoses     ICD-10-CM   1. Lumbosacral pain  M54.50   2. Sacroiliac joint dysfunction of right side  M53.3   3. DDD (degenerative disc disease), lumbar  M51.36   4. Lumbar facet arthropathy  M47.816   5. Bulge of lumbar disc without myelopathy  M51.26    6. Progressive supranuclear palsy (HCC)  G23.1           ASSESSMENT:  Girish Tyler 71 y.o. male seen for above     Girish was seen today for new patient.    Diagnoses and all orders for this visit:    Lumbosacral pain  -     Referral to Physical Therapy    Sacroiliac joint dysfunction of right side  -     Referral to Physical Therapy    DDD (degenerative disc disease), lumbar    Lumbar facet arthropathy    Bulge of lumbar disc without myelopathy    Progressive supranuclear palsy (HCC)      1. Discussed that he is currently attending physical therapy for his progressive supranuclear palsy.  No clear findings on exam to suggest radiculopathy.  Most recent MRI from 2019 reviewed.  Discussed plan for physical therapy at his current location to address low back pain and right sacroiliac joint dysfunction.  We will reassess need for after trial of physical therapy.  Unable to demonstrate neurologic deficit or musculoskeletal exam findings to account for report of left knee buckling.   2.  Discussed plan no medication changes at this time.  Request records from previous treatment with Dr. Jones for more.  Imaging if available.  3.  Continue care for progressive supra nuclear palsy with Dr. Boxer at [facility]    Follow-up: Return in about 2 months (around 5/3/2022).      Thank you very much for asking me to participate in Girish Tyler's care.  Please contact me with any questions or concerns.    Please note that this dictation was created using voice recognition software. I have made every reasonable attempt to correct obvious errors but there may be errors of grammar and content that I may have overlooked prior to finalization of this note.      Sebastian Higgins MD  Physical Medicine and Rehabilitation  Interventional Spine and Sports Physiatry  [facility]           Veronica Carolina M.

## 2022-03-08 DIAGNOSIS — M54.50 LUMBOSACRAL PAIN: ICD-10-CM

## 2022-03-08 DIAGNOSIS — M54.42 CHRONIC BILATERAL LOW BACK PAIN WITH LEFT-SIDED SCIATICA: ICD-10-CM

## 2022-03-08 DIAGNOSIS — G89.29 CHRONIC BILATERAL LOW BACK PAIN WITH LEFT-SIDED SCIATICA: ICD-10-CM

## 2022-03-15 ENCOUNTER — HOSPITAL ENCOUNTER (OUTPATIENT)
Dept: RADIOLOGY | Facility: MEDICAL CENTER | Age: 72
End: 2022-03-15
Payer: MEDICARE

## 2022-05-03 ENCOUNTER — APPOINTMENT (OUTPATIENT)
Dept: PHYSICAL MEDICINE AND REHAB | Facility: MEDICAL CENTER | Age: 72
End: 2022-05-03
Payer: MEDICARE

## 2022-06-23 ENCOUNTER — HOSPITAL ENCOUNTER (OUTPATIENT)
Dept: RADIOLOGY | Facility: MEDICAL CENTER | Age: 72
End: 2022-06-23
Payer: MEDICARE

## 2022-11-08 ENCOUNTER — PATIENT MESSAGE (OUTPATIENT)
Dept: HEALTH INFORMATION MANAGEMENT | Facility: OTHER | Age: 72
End: 2022-11-08

## 2022-11-30 RX ORDER — VALACYCLOVIR HYDROCHLORIDE 500 MG/1
500 TABLET, FILM COATED ORAL DAILY
Qty: 90 TABLET | Refills: 3 | Status: SHIPPED | OUTPATIENT
Start: 2022-11-30 | End: 2023-01-31 | Stop reason: SDUPTHER

## 2022-12-01 NOTE — TELEPHONE ENCOUNTER
Received request via: Patient    Was the patient seen in the last year in this department? Yes 12/2021    Does the patient have an active prescription (recently filled or refills available) for medication(s) requested? No    Does the patient have intermediate Plus and need 100 day supply (blood pressure, diabetes and cholesterol meds only)? Patient does not have SCP

## 2022-12-02 ENCOUNTER — TELEPHONE (OUTPATIENT)
Dept: SCHEDULING | Facility: IMAGING CENTER | Age: 72
End: 2022-12-02
Payer: MEDICARE

## 2022-12-02 DIAGNOSIS — G23.1 PROGRESSIVE SUPRANUCLEAR PALSY (HCC): ICD-10-CM

## 2022-12-02 NOTE — TELEPHONE ENCOUNTER
VOICEMAIL  1. Caller Name: Girish Tyler                       Call Back Number: [phone] (home)      2. Message:   Pt request a referral for Continuing physical therapy for PSP , referral needs to be faxed to [facility] Physical therapy out patient .  Pt schedule apt for 12/22/22 with Dr Hess.

## 2022-12-22 ENCOUNTER — APPOINTMENT (OUTPATIENT)
Dept: MEDICAL GROUP | Facility: CLINIC | Age: 72
End: 2022-12-22
Payer: MEDICARE

## 2023-01-31 ENCOUNTER — OFFICE VISIT (OUTPATIENT)
Dept: MEDICAL GROUP | Facility: CLINIC | Age: 73
End: 2023-01-31
Payer: MEDICARE

## 2023-01-31 VITALS
WEIGHT: 166 LBS | BODY MASS INDEX: 22.48 KG/M2 | SYSTOLIC BLOOD PRESSURE: 126 MMHG | HEIGHT: 72 IN | OXYGEN SATURATION: 94 % | DIASTOLIC BLOOD PRESSURE: 76 MMHG | TEMPERATURE: 98 F | HEART RATE: 74 BPM

## 2023-01-31 DIAGNOSIS — G23.1 PROGRESSIVE SUPRANUCLEAR PALSY (HCC): Primary | ICD-10-CM

## 2023-01-31 DIAGNOSIS — B00.9 HERPES: ICD-10-CM

## 2023-01-31 DIAGNOSIS — R56.9 SEIZURE (HCC): ICD-10-CM

## 2023-01-31 PROCEDURE — 99214 OFFICE O/P EST MOD 30 MIN: CPT | Performed by: FAMILY MEDICINE

## 2023-01-31 RX ORDER — VALACYCLOVIR HYDROCHLORIDE 500 MG/1
500 TABLET, FILM COATED ORAL DAILY
Qty: 90 TABLET | Refills: 3 | Status: SHIPPED | OUTPATIENT
Start: 2023-01-31

## 2023-01-31 ASSESSMENT — PATIENT HEALTH QUESTIONNAIRE - PHQ9: CLINICAL INTERPRETATION OF PHQ2 SCORE: 0

## 2023-01-31 NOTE — ASSESSMENT & PLAN NOTE
"Chronic condition. Controlled.  Last seizure per patient was august 2022 which occurred due to \"overwhelming stress.\"  Patient is not taking antiepileptics.  [facility] seizure rules looked up online.  No driving with seizure within 3 months.   "

## 2023-01-31 NOTE — ASSESSMENT & PLAN NOTE
Chronic condition. Controlled.  He uses a cane to walk.  His speech is good.   Diagnosed 2 years ago (5/2021).  He can perform all ADLs independently. He walks 3-5 miles per week. He has an exercise bike to use when the weather is not good.  He meditates daily.

## 2023-01-31 NOTE — PATIENT INSTRUCTIONS
[facility] doctors to establish care with nearer your [facility]...  Dr. Deb Estrada

## 2023-01-31 NOTE — PROGRESS NOTES
HPI:  Patient is a 72 y.o. male. This pleasant patient is here today to discuss progressive supranuclear palsy and associated therapies he needs.    Problem   Progressive Supranuclear Palsy (Hcc)    Diagnosed 5/2021 by neurologist. Does well in the morning. Sleeps 9 hours a night. Often naps.  Feels well until 4 pm.  At times more stimulation, loud sounds, make him startle.  He now lives with his ex-wife in a guest room in [place].  He let his drivers license lapse as he  Moved to [place] to move in with his brother.  Swallowing at times is difficult.  He is taking it easy and he feels better than last summer.     Seizure (Hcc)    Sees Dr. May, neurologist, at [facility].  Not taking antiepileptics.                                                                                                                                     Patient Active Problem List    Diagnosis Date Noted    Progressive supranuclear palsy (HCC) 06/18/2021    Lumbar pain 06/18/2021    Pain of right hip joint 06/18/2021    Left inguinal hernia 01/11/2021    Family history of coronary artery disease 11/30/2020    Seizure (HCC) 11/30/2020    Left-sided weakness 12/23/2019    Herpes 12/23/2019    Herpes simplex of male genitalia 02/26/2013    Osteoarthritis, shoulder 02/26/2013       Current Outpatient Medications   Medication Sig Dispense Refill    valACYclovir (VALTREX) 500 MG Tab Take 1 Tablet by mouth every day. 90 Tablet 3    diclofenac sodium (VOLTAREN) 1 % Gel Apply 4 g topically 4 times a day as needed (pain). 100 g 5    Multiple Vitamins-Minerals (MULTIVITAMIN ADULT PO) Take  by mouth every day.      Vitamin A 2250 MCG (7500 UT) Cap Take  by mouth every day.      ibuprofen (MOTRIN) 200 MG Tab Take 800 mg by mouth 2 times a day as needed (PAIN).      vitamin D (CHOLECALCIFEROL) 1000 Unit (25 mcg) Tab Take 1,000 Units by mouth every day.       No current facility-administered medications for this visit.         Allergies as of 01/31/2023    (No  "Known Allergies)          /76   Pulse 74   Temp 36.7 °C (98 °F)   Ht 1.829 m (6')   Wt 75.3 kg (166 lb)   SpO2 94%   BMI 22.51 kg/m²     Gen: no fevers/chills, no changes in weight  Eyes: no changes in vision  ENT: no sore throat, no hearing loss, no bloody nose  Pulm: no sob, no cough  CV: no chest pain, no palpitations  GI: occasional dysphagia, no nausea/vomiting, no diarrhea  : no dysuria or flank pain  MSk: no myalgias  Skin: no rash  Psych: no change in mood   Msk: walks with a cane.   Neuro: no headaches, no numbness/tingling  Heme/Lymph: no easy bruising or bleeding    Physical Exam:  Gen:         Alert and oriented, No apparent distress. Walks with a cane.  Neck:        No Lymphadenopathy or Bruits.  Lungs:     Clear to auscultation bilaterally  CV:           Regular rate and rhythm. No murmurs, rubs or gallops.    Abdomen: soft, nontender, nondistended.    Skin:      no rash or exudate             Ext:          No clubbing, cyanosis, edema.      Assessment and Plan    72 y.o. male with the following-  Problem List Items Addressed This Visit       Herpes    Relevant Medications    valACYclovir (VALTREX) 500 MG Tab    Progressive supranuclear palsy (HCC)     Chronic condition. Controlled.  He uses a cane to walk.  His speech is good.   Diagnosed 2 years ago (5/2021).  He can perform all ADLs independently. He walks 3-5 miles per week. He has an exercise bike to use when the weather is not good.  He meditates daily.          Relevant Medications    diclofenac sodium (VOLTAREN) 1 % Gel    Other Relevant Orders    Referral to Speech Therapy    Referral to Physical Therapy    Referral to Occupational Therapy    Seizure (HCC)     Chronic condition. Controlled.  Last seizure per patient was august 2022 which occurred due to \"overwhelming stress.\"  Patient is not taking antiepileptics.  [place] DMV seizure rules looked up online.  No driving with seizure within 3 months.           Return if symptoms " worsen or fail to improve.

## 2023-02-22 ENCOUNTER — PATIENT MESSAGE (OUTPATIENT)
Dept: MEDICAL GROUP | Facility: CLINIC | Age: 73
End: 2023-02-22
Payer: MEDICARE

## 2023-02-24 ENCOUNTER — APPOINTMENT (OUTPATIENT)
Dept: MEDICAL GROUP | Facility: CLINIC | Age: 73
End: 2023-02-24
Payer: MEDICARE

## 2023-06-01 ENCOUNTER — HOSPITAL ENCOUNTER (OUTPATIENT)
Dept: LAB | Facility: MEDICAL CENTER | Age: 73
End: 2023-06-01
Attending: FAMILY MEDICINE
Payer: MEDICARE

## 2023-06-01 PROCEDURE — 86596 VOLTAGE-GTD CA CHNL ANTB EA: CPT

## 2023-06-01 PROCEDURE — 369999 HCHG MISC LAB CHARGE

## 2023-06-01 PROCEDURE — 84182 PROTEIN WESTERN BLOT TEST: CPT

## 2023-06-01 PROCEDURE — 86341 ISLET CELL ANTIBODY: CPT

## 2023-06-01 PROCEDURE — 86255 FLUORESCENT ANTIBODY SCREEN: CPT

## 2023-06-01 PROCEDURE — 36415 COLL VENOUS BLD VENIPUNCTURE: CPT

## 2023-06-11 LAB — TEST NAME 95000: NORMAL

## 2023-06-25 LAB — TEST NAME 95000: NORMAL

## 2025-05-27 ENCOUNTER — HOSPITAL ENCOUNTER (OUTPATIENT)
Dept: RADIOLOGY | Facility: MEDICAL CENTER | Age: 75
End: 2025-05-27

## 2025-06-02 ENCOUNTER — APPOINTMENT (OUTPATIENT)
Dept: OPHTHALMOLOGY | Facility: MEDICAL CENTER | Age: 75
End: 2025-06-02
Payer: MEDICARE

## 2025-06-02 DIAGNOSIS — H52.13 MYOPIA OF BOTH EYES: ICD-10-CM

## 2025-06-02 DIAGNOSIS — G23.1 PROGRESSIVE SUPRANUCLEAR PALSY (HCC): Primary | ICD-10-CM

## 2025-06-02 PROCEDURE — 99204 OFFICE O/P NEW MOD 45 MIN: CPT | Mod: 25 | Performed by: STUDENT IN AN ORGANIZED HEALTH CARE EDUCATION/TRAINING PROGRAM

## 2025-06-02 PROCEDURE — 92060 SENSORIMOTOR EXAMINATION: CPT | Performed by: STUDENT IN AN ORGANIZED HEALTH CARE EDUCATION/TRAINING PROGRAM

## 2025-06-02 PROCEDURE — 92250 FUNDUS PHOTOGRAPHY W/I&R: CPT | Performed by: STUDENT IN AN ORGANIZED HEALTH CARE EDUCATION/TRAINING PROGRAM

## 2025-06-02 ASSESSMENT — VISUAL ACUITY
OD_CC: 20/20
CORRECTION_TYPE: GLASSES
OS_CC: J1+
OD_CC: J1+
METHOD: SNELLEN - LINEAR
OS_CC: 20/20

## 2025-06-02 ASSESSMENT — REFRACTION_MANIFEST
OS_AXIS: 172
OD_AXIS: 180
METHOD_AUTOREFRACTION: 1
OS_SPHERE: -1.50
OS_CYLINDER: +1.50
OD_CYLINDER: +0.25
OD_SPHERE: -0.50

## 2025-06-02 ASSESSMENT — CONF VISUAL FIELD
OD_SUPERIOR_NASAL_RESTRICTION: 0
OD_NORMAL: 1
OS_INFERIOR_TEMPORAL_RESTRICTION: 0
OS_SUPERIOR_TEMPORAL_RESTRICTION: 0
OD_INFERIOR_NASAL_RESTRICTION: 0
OD_INFERIOR_TEMPORAL_RESTRICTION: 0
OS_SUPERIOR_NASAL_RESTRICTION: 0
OS_NORMAL: 1
OS_INFERIOR_NASAL_RESTRICTION: 0
OD_SUPERIOR_TEMPORAL_RESTRICTION: 0

## 2025-06-02 ASSESSMENT — REFRACTION_WEARINGRX
OD_ADD: +1.75
SPECS_TYPE: TRIFOCAL
OS_CYLINDER: +0.75
OS_SPHERE: -0.50
OD_CYLINDER: +1.25
OS_ADD: +1.50
OD_AXIS: 030
OS_AXIS: 004
OD_SPHERE: -0.25

## 2025-06-02 ASSESSMENT — EXTERNAL EXAM - LEFT EYE: OS_EXAM: NORMAL

## 2025-06-02 ASSESSMENT — TONOMETRY
OS_IOP_MMHG: 11
OD_IOP_MMHG: 13

## 2025-06-02 ASSESSMENT — ENCOUNTER SYMPTOMS
WEAKNESS: 1
DOUBLE VISION: 1

## 2025-06-02 ASSESSMENT — SLIT LAMP EXAM - LIDS
COMMENTS: NORMAL
COMMENTS: NORMAL

## 2025-06-02 ASSESSMENT — CUP TO DISC RATIO
OS_RATIO: 0.1
OD_RATIO: 0.0

## 2025-06-02 ASSESSMENT — EXTERNAL EXAM - RIGHT EYE: OD_EXAM: NORMAL

## 2025-06-02 NOTE — PROGRESS NOTES
Peds/Neuro Ophthalmology:   Chip Kitchen    Date & Time note created:    6/2/2025   9:09 AM     Referring MD / APRN:  Veronica Barragan M.D., No att. providers found    Patient ID:  Name:             Girish Tyler   YOB: 1950  Age:                 74 y.o.  male   MRN:               [id number]    Chief Complaint/Reason for Visit:     Other (Progressive supra nuclear palsy)      History of Present Illness:    Girish Tyler is a 74 y.o. male   Other  Associated symptoms include weakness.       Review of Systems:  Review of Systems   Eyes:  Positive for double vision.   Neurological:  Positive for weakness.   All other systems reviewed and are negative.      Past Medical History:   Past Medical History[1]    Past Surgical History:  Past Surgical History[2]    Current Outpatient Medications:  Current Medications[3]    Allergies:  Allergies[4]    Family History:  Family History   Problem Relation Age of Onset    Glasses Mother        Social History:  Social History     Socioeconomic History    Marital status:      Spouse name: Not on file    Number of children: Not on file    Years of education: Not on file    Highest education level: Not on file   Occupational History    Not on file   Tobacco Use    Smoking status: Never    Smokeless tobacco: Never   Vaping Use    Vaping status: Never Used   Substance and Sexual Activity    Alcohol use: Yes     Comment: 1-4 per week    Drug use: Not Currently    Sexual activity: Not on file   Other Topics Concern     Service No    Blood Transfusions No    Caffeine Concern No    Occupational Exposure No    Hobby Hazards No    Sleep Concern Yes    Stress Concern Yes    Weight Concern No    Special Diet No    Back Care No    Exercise Yes    Bike Helmet No    Seat Belt Yes    Self-Exams No   Social History Narrative    Retired     Social Drivers of Health     Financial Resource Strain: Not on file   Food Insecurity: Not on file  "  Transportation Needs: Not on file   Physical Activity: Not on file   Stress: Not on file   Social Connections: Not on file   Intimate Partner Violence: Not on file   Housing Stability: Not on file          Physical Exam:  Physical Exam    Oriented x 3  Weight/BMI: There is no height or weight on file to calculate BMI.  There were no vitals taken for this visit.    Base Eye Exam       Visual Acuity (Snellen - Linear)         Right Left    Dist cc 20/20 20/20    Near cc J1+ J1+      Correction: Glasses              Tonometry (i care, 9:05 AM)         Right Left    Pressure 13 11              Pupils         Pupils    Right PERRL    Left PERRL                  Refraction       Wearing Rx         Sphere Cylinder Axis Add    Right -0.25 +1.25 030 +1.75    Left -0.50 +0.75 004 +1.50      Age: 1yr    Type: Trifocal              Manifest Refraction (Auto)         Sphere Cylinder Axis    Right -0.50 +0.25 180    Left -1.50 +1.50 172                    Pertinent Lab/Test/Imaging Review:      Assessment and Plan:     No problem-specific Assessment & Plan notes found for this encounter.        Chip Kitchen       [1]   Past Medical History:  Diagnosis Date    Arthritis     shoulders-osteo    Gait abnormality 01/2021    Pt is being worked up for \"freezing of gait\"  may be related to seizure meds recently taking    High cholesterol     No meds, monitoring.    History of anemia 01/2021    No a current issue    Seizure (HCC) 01/05/2021    Doesn't lose consciousness,  was on lamotrigine stopped 2 mos ago per neurologist inst.approx 8 total seizures  12/2019- 4/20, last seizure, followed by Dr. Conklin, now  present  neurologist @ [facility]    [2]   Past Surgical History:  Procedure Laterality Date    LAPAROSCOPIC INGUINAL HERNIA REPAIR Left 1/11/2021    Procedure: REPAIR, HERNIA, INGUINAL, LAPAROSCOPIC;  Surgeon: Chuy Yee M.D.;  Location: SURGERY [facility];  Service: General    CYSTECTOMY  2015    Tarlov cyst repair S2, " discetomy L5    INGUINAL HERNIA REPAIR Right 2005    HERNIA REPAIR Right 1998    Inguinal   [3]   Current Outpatient Medications   Medication Sig Dispense Refill    valACYclovir (VALTREX) 500 MG Tab Take 1 Tablet by mouth every day. 90 Tablet 3    diclofenac sodium (VOLTAREN) 1 % Gel Apply 4 g topically 4 times a day as needed (pain). 100 g 5    Multiple Vitamins-Minerals (MULTIVITAMIN ADULT PO) Take  by mouth every day.      ibuprofen (MOTRIN) 200 MG Tab Take 800 mg by mouth 2 times a day as needed (PAIN).      vitamin D (CHOLECALCIFEROL) 1000 Unit (25 mcg) Tab Take 1,000 Units by mouth every day.      Vitamin A 2250 MCG (7500 UT) Cap Take  by mouth every day.       No current facility-administered medications for this visit.   [4] No Known Allergies

## 2025-06-02 NOTE — PROCEDURES
OD: pink, small, tilted. Myopic degeneration. CD 0.0    OS: pink, small, tilted. Myopic degeneration. CD 0.1

## 2025-06-02 NOTE — ASSESSMENT & PLAN NOTE
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

## 2025-06-02 NOTE — PROGRESS NOTES
Peds/Neuro Ophthalmology:   Brock Garcia M.D.    Date & Time note created:    6/2/2025   12:04 PM     Referring MD / APRN:  Veronica Barragan M.D., No att. providers found    Patient ID:  Name:             Girish Tyler   YOB: 1950  Age:                 74 y.o.  male   MRN:               [id number]    Chief Complaint/Reason for Visit:     Other (Progressive supra nuclear palsy)      History of Present Illness:      Girish Tyler is a 73 yo man referred fro PSP    Girish is a patient of Dr Daley ([facility]) and Dr Boxer ([facility]). He is currently on amitriptyline 10 mg and baclofen 10 mg .     Girish presents to establish care and obtain baseline visual exam in the setting of PSP. Has has noticed he has been having subtle worsening reading and increased dystonic episodes. During the dystonic episodes he develops double vision (unsure if it is binocular or monocular) that last several minutes. Due to subtle worsening with tracking he cannot read or watch screens for a prolonged time because he will develop nausea. He denies any zohaib double vision outside the dystonic episodes.         Review of Systems:  ROS    Past Medical History:   Past Medical History[1]    Past Surgical History:  Past Surgical History[2]    Current Outpatient Medications:  Current Medications[3]    Allergies:  Allergies[4]    Family History:  Family History   Problem Relation Age of Onset    Glasses Mother        Social History:  Social History     Socioeconomic History    Marital status:      Spouse name: Not on file    Number of children: Not on file    Years of education: Not on file    Highest education level: Not on file   Occupational History    Not on file   Tobacco Use    Smoking status: Never    Smokeless tobacco: Never   Vaping Use    Vaping status: Never Used   Substance and Sexual Activity    Alcohol use: Yes     Comment: 1-4 per week    Drug use: Not Currently    Sexual activity: Not on file   Other  Topics Concern     Service No    Blood Transfusions No    Caffeine Concern No    Occupational Exposure No    Hobby Hazards No    Sleep Concern Yes    Stress Concern Yes    Weight Concern No    Special Diet No    Back Care No    Exercise Yes    Bike Helmet No    Seat Belt Yes    Self-Exams No   Social History Narrative    Retired     Social Drivers of Health     Financial Resource Strain: Not on file   Food Insecurity: Not on file   Transportation Needs: Not on file   Physical Activity: Not on file   Stress: Not on file   Social Connections: Not on file   Intimate Partner Violence: Not on file   Housing Stability: Not on file          Physical Exam:  Physical Exam    Oriented x 3  Weight/BMI: There is no height or weight on file to calculate BMI.  There were no vitals taken for this visit.    Base Eye Exam       Visual Acuity (Snellen - Linear)         Right Left    Dist cc 20/20 20/20    Near cc J1+ J1+      Correction: Glasses              Tonometry (i care, 9:05 AM)         Right Left    Pressure 13 11              Pupils         Pupils Dark Light Shape React APD    Right PERRL 4 3 Round Brisk None    Left PERRL 4 3 Round Brisk None              Visual Fields         Right Left     Full Full              Extraocular Movement         Right Left     Abnormal Full     -- 75 --   --  --   -- -- --    -- 85 --   --  --   -- -- --                 Neuro/Psych       Oriented x3: Yes    Mood/Affect: Normal                      Additional Tests       Color         Right Left    Ishihara 9/9 9/9              Stereo       Fly: +    Animals: 3/3    Circles: 4/9                  Strabismus Exam       Method: Alternate cover      Distance Near Near +3DS N Bifocals                      - - 75 - -  Ortho  - - 85 - -                      Ex flick  - -  - -  Ex flick  - -  - -  Ex flick                      - - - - - -  Ortho  - - - - - -                Moves head to compensate for slow vertical saccades  OKN: occasional  hypometric pursuit, delayed saccade in upgaze  Upgaze deficit OD>OS, able to improve with VOR        Slit Lamp and Fundus Exam       External Exam         Right Left    External Normal Normal              Slit Lamp Exam         Right Left    Lids/Lashes Normal Normal    Conjunctiva/Sclera White and quiet White and quiet    Cornea Clear Clear    Anterior Chamber Deep and quiet Deep and quiet    Iris Round and reactive Round and reactive    Lens PCIOL PCIOL              Fundus Exam         Right Left    Disc pink, small/tilted, large area of PPA pink, small/tilted, large area of PPA    C/D Ratio 0.0 0.1    Macula Normal Normal                  Refraction       Wearing Rx         Sphere Cylinder Axis Add    Right -0.25 +1.25 030 +1.75    Left -0.50 +0.75 004 +1.50      Age: 3yr    Type: Trifocal              Manifest Refraction (Auto)         Sphere Cylinder Axis    Right -0.50 +0.25 180    Left -1.50 +1.50 172                    Pertinent Lab/Test/Imaging Review:      Assessment and Plan:     Progressive supranuclear palsy (HCC)  75 yo man with PMH of PSP who presents for baseline exam    Diagnosed 5/2021. Reports some difficulty with tracking affecting prolonged reading and screen time. Increasing episodes of dystonia where he develops double vision. Unclear if double vision is binocular or monocular. Lasts several minutes    Exam 6/2/25: No APD, VA 20/20 OD and OS, 9/9 color plates OD and OS, full CF OU. Subtle upgaze deficit OD>OS, able to overcome with VOR. Delayed saccades on upgaze. OKN occasional hypometric saccades in upgaze, head tilting to accommodate delayed saccades. Ortho alignment in all gazes. Optic nerves pink, small and titled with myopic degeneration. OCT poor alignment    Plan:   Exam demonstrates subtle efferent abnormalities, specifically in vertical movements, that is seen in PSP. Currently no evidence of binocular diplopia. Pt is compensating with delayed vertical saccades with head movement.  "RTC in 1 year, discussed signs and symptoms for sooner return. 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  45 total minutes were spent reviewing imaging, records, examining the patient and documenting.          [1]   Past Medical History:  Diagnosis Date    Arthritis     shoulders-osteo    Gait abnormality 01/2021    Pt is being worked up for \"freezing of gait\"  may be related to seizure meds recently taking    High cholesterol     No meds, monitoring.    History of anemia 01/2021    No a current issue    Seizure (HCC) 01/05/2021    Doesn't lose consciousness,  was on lamotrigine stopped 2 mos ago per neurologist inst.approx 8 total seizures  12/2019- 4/20, last seizure, followed by Dr. Conklin, now  present  neurologist @ [facility]    [2]   Past Surgical History:  Procedure Laterality Date    LAPAROSCOPIC INGUINAL HERNIA REPAIR Left 1/11/2021    Procedure: REPAIR, HERNIA, INGUINAL, LAPAROSCOPIC;  Surgeon: Chuy Yee M.D.;  Location: SURGERY [facility];  Service: General    CYSTECTOMY  2015    Tarlov cyst repair S2, discetomy L5    INGUINAL HERNIA REPAIR Right 2005    HERNIA REPAIR Right 1998    Inguinal   [3]   Current Outpatient Medications   Medication Sig Dispense Refill    diclofenac DR (VOLTAREN) 50 MG Tablet Delayed Response Take 50 mg by mouth 2 times a day.      valACYclovir (VALTREX) 500 MG Tab Take 1 Tablet by mouth every day. 90 Tablet 3    diclofenac sodium (VOLTAREN) 1 % Gel Apply 4 g topically 4 times a day as needed (pain). 100 g 5    Multiple Vitamins-Minerals (MULTIVITAMIN ADULT PO) Take  by mouth every day.      ibuprofen (MOTRIN) 200 MG Tab Take 800 mg by mouth 2 times a day as needed (PAIN).      vitamin D (CHOLECALCIFEROL) 1000 Unit (25 mcg) Tab Take 1,000 Units by mouth every day.      Vitamin A " 2250 MCG (7500 UT) Cap Take  by mouth every day.       No current facility-administered medications for this visit.   [4] No Known Allergies

## 2025-06-02 NOTE — ASSESSMENT & PLAN NOTE
73 yo man with PMH of PSP who presents for baseline exam    Diagnosed 5/2021. Reports some difficulty with tracking affecting prolonged reading and screen time. Increasing episodes of dystonia where he develops double vision. Unclear if double vision is binocular or monocular. Lasts several minutes    Exam 6/2/25: No APD, VA 20/20 OD and OS, 9/9 color plates OD and OS, full CF OU. Subtle upgaze deficit OD>OS, able to overcome with VOR. Delayed saccades on upgaze. OKN occasional hypometric saccades in upgaze, head tilting to accommodate delayed saccades. Ortho alignment in all gazes. Optic nerves pink, small and titled with myopic degeneration. OCT poor alignment    Plan:   Exam demonstrates subtle efferent abnormalities, specifically in vertical movements, that is seen in PSP. Currently no evidence of binocular diplopia. Pt is compensating with delayed vertical saccades with head movement. RTC in 1 year, discussed signs and symptoms for sooner return. Continue care with Dr Daley

## (undated) DEVICE — ELECTRODE 850 FOAM ADHESIVE - HYDROGEL RADIOTRNSPRNT (50/PK)

## (undated) DEVICE — SYSTEM CLEARIFY VISUALIZATION (10EA/PK)

## (undated) DEVICE — GLOVE BIOGEL SZ 7.5 SURGICAL PF LTX - (50PR/BX 4BX/CA)

## (undated) DEVICE — TUBING CLEARLINK DUO-VENT - C-FLO (48EA/CA)

## (undated) DEVICE — APPLICATOR COTTON TIP 6 IN - STERILE (2EA/PK 100PK/BX)

## (undated) DEVICE — GOWN WARMING STANDARD FLEX - (30/CA)

## (undated) DEVICE — SUTURE 0 VICRYL PLUS CT-2 - 27 INCH (36/BX)

## (undated) DEVICE — TUBING LAPAROSCOPIC PLUME DEVICE (10EA/CA)

## (undated) DEVICE — TROCAR Z THREAD11MM OPTICAL - NON BLADED(6/BX)

## (undated) DEVICE — SYSTEM DISSECTION BALLOON  KII ROUND WITH 2 EACH 5MM LOW PROFILE TROCARS (3EA/BX)

## (undated) DEVICE — SENSOR SPO2 NEO LNCS ADHESIVE (20/BX) SEE USER NOTES

## (undated) DEVICE — ELECTRODE DUAL RETURN W/ CORD - (50/PK)

## (undated) DEVICE — SYSTEM DISSECTION BALLOON  KII OVAL WITH 2 EACH 5MM LOW PROFILE TROCARS (3EA/BX)

## (undated) DEVICE — CHLORAPREP 26 ML APPLICATOR - ORANGE TINT(25/CA)

## (undated) DEVICE — BLADE SURGICAL #15 - (50/BX 3BX/CA)

## (undated) DEVICE — SLEEVE, VASO, THIGH, MED

## (undated) DEVICE — SUTURE 3-0 VICRYL PLUS RB-1 - (36/BX)

## (undated) DEVICE — SUCTION INSTRUMENT YANKAUER BULBOUS TIP W/O VENT (50EA/CA)

## (undated) DEVICE — ELECTRODE 5MM LHK LAPSCP STERILE DISP- MEGADYNE  (5/CA)

## (undated) DEVICE — NEPTUNE 4 PORT MANIFOLD - (20/PK)

## (undated) DEVICE — Device

## (undated) DEVICE — SET EXTENSION WITH 2 PORTS (48EA/CA) ***PART #2C8610 IS A SUBSTITUTE*****

## (undated) DEVICE — PROTECTOR ULNA NERVE - (36PR/CA)

## (undated) DEVICE — GLOVE BIOGEL INDICATOR SZ 7.5 SURGICAL PF LTX - (50PR/BX 4BX/CA)

## (undated) DEVICE — LACTATED RINGERS INJ 1000 ML - (14EA/CA 60CA/PF)

## (undated) DEVICE — KIT ANESTHESIA W/CIRCUIT & 3/LT BAG W/FILTER (20EA/CA)

## (undated) DEVICE — HEAD HOLDER JUNIOR/ADULT

## (undated) DEVICE — SCISSORS 5MM CVD (6EA/BX)

## (undated) DEVICE — MASK ANESTHESIA ADULT  - (100/CA)

## (undated) DEVICE — SUTURE 3-0 VICRYL PLUS SH - 27 INCH (36/BX)

## (undated) DEVICE — CANISTER SUCTION 3000ML MECHANICAL FILTER AUTO SHUTOFF MEDI-VAC NONSTERILE LF DISP  (40EA/CA)

## (undated) DEVICE — SUTURE GENERAL

## (undated) DEVICE — SUTURE 4-0 MONOCRYL PLUS PS-2 - 27 INCH (36/BX)

## (undated) DEVICE — PLUMEPEN ULTRA 3/8 IN X 10 FT HOSE (20EA/CA)

## (undated) DEVICE — DEVICE 5MM ABSRB STRAP FIXATION  (6EA/BX)